# Patient Record
Sex: FEMALE | Race: WHITE | NOT HISPANIC OR LATINO | Employment: OTHER | URBAN - METROPOLITAN AREA
[De-identification: names, ages, dates, MRNs, and addresses within clinical notes are randomized per-mention and may not be internally consistent; named-entity substitution may affect disease eponyms.]

---

## 2017-11-15 ENCOUNTER — TELEPHONE (OUTPATIENT)
Dept: NEUROLOGY | Facility: CLINIC | Age: 47
End: 2017-11-15

## 2017-11-15 NOTE — TELEPHONE ENCOUNTER
Called and left message for Patient to call and make an appointment to be seen.Number provided.Attempted to call cell phone but number listed is not working.

## 2017-12-19 ENCOUNTER — OFFICE VISIT (OUTPATIENT)
Dept: NEUROLOGY | Facility: CLINIC | Age: 47
End: 2017-12-19
Payer: OTHER GOVERNMENT

## 2017-12-19 VITALS
BODY MASS INDEX: 28.99 KG/M2 | HEIGHT: 60 IN | WEIGHT: 147.69 LBS | HEART RATE: 67 BPM | SYSTOLIC BLOOD PRESSURE: 112 MMHG | DIASTOLIC BLOOD PRESSURE: 72 MMHG

## 2017-12-19 DIAGNOSIS — R51.9 CHRONIC DAILY HEADACHE: ICD-10-CM

## 2017-12-19 DIAGNOSIS — G43.709 CHRONIC MIGRAINE WITHOUT AURA WITHOUT STATUS MIGRAINOSUS, NOT INTRACTABLE: Primary | ICD-10-CM

## 2017-12-19 PROCEDURE — 64400 NJX AA&/STRD TRIGEMINAL NRV: CPT | Mod: S$PBB,51,RT, | Performed by: NURSE PRACTITIONER

## 2017-12-19 PROCEDURE — 99213 OFFICE O/P EST LOW 20 MIN: CPT | Mod: PBBFAC | Performed by: NURSE PRACTITIONER

## 2017-12-19 PROCEDURE — 64405 NJX AA&/STRD GR OCPL NRV: CPT | Mod: S$PBB,RT,, | Performed by: NURSE PRACTITIONER

## 2017-12-19 PROCEDURE — 99204 OFFICE O/P NEW MOD 45 MIN: CPT | Mod: 25,S$PBB,, | Performed by: NURSE PRACTITIONER

## 2017-12-19 PROCEDURE — 64400 NJX AA&/STRD TRIGEMINAL NRV: CPT | Mod: PBBFAC,RT | Performed by: NURSE PRACTITIONER

## 2017-12-19 PROCEDURE — 64405 NJX AA&/STRD GR OCPL NRV: CPT | Mod: PBBFAC,RT | Performed by: NURSE PRACTITIONER

## 2017-12-19 PROCEDURE — 99999 PR PBB SHADOW E&M-EST. PATIENT-LVL III: CPT | Mod: PBBFAC,,, | Performed by: NURSE PRACTITIONER

## 2017-12-19 RX ORDER — BUPROPION HYDROCHLORIDE 150 MG/1
150 TABLET ORAL DAILY
COMMUNITY
Start: 2017-12-08

## 2017-12-19 RX ORDER — ERGOCALCIFEROL 1.25 MG/1
50000 CAPSULE ORAL WEEKLY
COMMUNITY
Start: 2017-10-09 | End: 2018-10-19 | Stop reason: SDUPTHER

## 2017-12-19 RX ORDER — DIAZEPAM 5 MG/1
5 TABLET ORAL 2 TIMES DAILY PRN
COMMUNITY
Start: 2017-12-04

## 2017-12-19 RX ORDER — SUMATRIPTAN SUCCINATE 4 MG/.5ML
INJECTION, SOLUTION SUBCUTANEOUS
Qty: 12 EACH | Refills: 5 | Status: SHIPPED | OUTPATIENT
Start: 2017-12-19 | End: 2018-04-20 | Stop reason: SDUPTHER

## 2017-12-19 RX ORDER — TRAMADOL HYDROCHLORIDE 50 MG/1
50 TABLET ORAL 2 TIMES DAILY PRN
COMMUNITY
Start: 2017-12-04

## 2017-12-19 RX ORDER — ASPIRIN 325 MG
50000 TABLET, DELAYED RELEASE (ENTERIC COATED) ORAL
COMMUNITY
Start: 2016-08-30 | End: 2017-12-19 | Stop reason: SDUPTHER

## 2017-12-19 RX ORDER — DOXEPIN 6 MG/1
6 TABLET, FILM COATED ORAL NIGHTLY
COMMUNITY
End: 2019-02-22

## 2017-12-19 RX ORDER — DIVALPROEX SODIUM 500 MG/1
1000 TABLET, FILM COATED, EXTENDED RELEASE ORAL NIGHTLY
COMMUNITY
Start: 2017-08-23 | End: 2018-01-26 | Stop reason: SDUPTHER

## 2017-12-19 RX ORDER — PREGABALIN 150 MG/1
150 CAPSULE ORAL 2 TIMES DAILY
COMMUNITY
Start: 2016-08-29

## 2017-12-19 RX ORDER — ACETAMINOPHEN 500 MG
5000 TABLET ORAL DAILY
COMMUNITY
Start: 2016-08-30

## 2017-12-19 NOTE — PROGRESS NOTES
SUBJECTIVE:  Patient ID: Massiel Alatorre   MRN: [id number]  Referred By: Aaarefoj Self  Chief Complaint: Headache and Consult    History of Present Illness:   47 y.o. female with chronic migraines, anxiety, depression, vitamin D deficiency, and trouble sleeping, who presents to clinic with her significant other for evaluation of headaches.  Headaches previously managed by Neurologist in [place], but  forced her to establish care at alternative facility.    Headaches initially began in her early teenage years, states she has tried numerous medications none of which helped her.  Most recently she started Botox injections, states she had two rounds, after which she was only experiencing 7-8 headaches per month, last round done in August.  Since the middle of October, she has been experiencing a constant migraine all day, everyday.  Pain is described as a piercing or throbbing pain located on the right side of her head.  Onset of migraine very abrupt, over about 20 minutes.  Associated symptoms include nausea, photophobia, phonophobia.  She is currently taking Depakote 1000 mg nightly, Wellbutrin, Lyrica, and doxepin daily for prevention.  In the past she has used sumatriptan injectable for abortive, which she thinks worked, however caused facial flushing.  She would like to restart Botox injections for chronic migraine.      Treatments Tried and Response  Sumatriptan injectable - side effects, but it was effective    Imitrex - some relief   Maxalt - she did get some relief   Elavil - lost effectiveness   Doxepin -   Depakote   Cymbalta -   Topiramate - caused memory problems   Percocet -   Ultram -   Excedrin -   Lyrica -   Botox - helped   Wellbutrin - ?  Prednisone - helps, but causes her to break out in acne     Note from [facility]:  Number of headache days/month  Current: 25/30   Change from baseline: 30/30  Number of headache hours/headache day  Current: 12   Change from baseline: 24  Headache  intensity (1-10): 8   Rescue medication utilized: Excedrin migraine   Headache intensity after rescue medication (1-10): 6  Overall patient response to BOTOX® (onabotulinumtoxinA) treatment: Overall patient is Botox is helping with her migraines. Topamax was discontinued because it was causing memory loss. She continues depakote 250 mg twice daily. She does not like the unpleasant side effects of flushing feeling when using Imitrex injections. Headaches occur in right frontal region and radiate to occipital region. She reports auras of seeing purple and white spots. She reports photophobia. Weather changes trigger headaches.   Medications/therapies tried and failed in past: Norco, Ultram, Percocet, Excedrin Migraine, Lyrica, Topamax, Elavil, Cymbalta, Maxalt, and Imitrex    Current Medications:    ASPIRIN/ACETAMINOPHEN/CAFFEINE (EXCEDRIN MIGRAINE ORAL), Take by mouth 2 (two) times daily as needed., Disp: , Rfl:     cholecalciferol, vitamin D3, 5,000 unit Tab, Take 5,000 Units by mouth once daily., Disp: , Rfl:     divalproex ER (DEPAKOTE) 500 MG Tb24, Take 1,000 mg by mouth every evening., Disp: , Rfl:     doxepin (SILENOR) 6 mg Tab, Take 6 mg by mouth every evening., Disp: , Rfl:     pregabalin (LYRICA) 150 MG capsule, Take 150 mg by mouth 3 (three) times daily., Disp: , Rfl:     buPROPion (WELLBUTRIN XL) 150 MG TB24 tablet, Take 150 mg by mouth once daily., Disp: , Rfl:     diazePAM (VALIUM) 5 MG tablet, Take 5 mg by mouth 2 (two) times daily as needed., Disp: , Rfl:     ergocalciferol (ERGOCALCIFEROL) 50,000 unit Cap, Take 50,000 Units by mouth once a week., Disp: , Rfl:     SUMAtriptan succinate 4 mg/0.5 mL PnIj, Inject into skin at onset of migraine, may repeat dose in 1 hour if needed. No more than 12 mg/day. No more than 3 days per week., Disp: 12 each, Rfl: 5    traMADol (ULTRAM) 50 mg tablet, Take 50 mg by mouth 2 (two) times daily as needed., Disp: , Rfl:   Current Facility-Administered  Medications:     [START ON 1/19/2018] onabotulinumtoxina injection 200 Units, 200 Units, Intramuscular, Q90 Days, SHMUEL Irizarry    Review of Systems - as per HPI, otherwise a balanced 10 systems review is negative.    OBJECTIVE:  Vitals:  /72 (BP Location: Right arm, Patient Position: Sitting, BP Method: Large (Automatic))   Pulse 67   Ht 5' (1.524 m)   Wt 67 kg (147 lb 11.3 oz)   BMI 28.85 kg/m²     Physical Exam   Constitutional: She appears well-developed and well-nourished. She is well groomed. NAD  HENT:    Head: Normocephalic and atraumatic, oral and nasal mucosa intact.  Frontalis was NTTP, temporalis was NTTP   Eyes: Conjunctivae and EOM are normal. Pupils are equal, round, and reactive to light   Neck: Neck supple. .Occiput and trapezius NTTP bilaterally, DROM in neck in all 6 directions.   Musculoskeletal: Normal range of motion. No joint stiffness. No vertebral point tenderness.  Skin: Skin is warm and dry.  Psychiatric: Normal mood and affect.     Neuro exam:    Mental status:  The patient is awake, alert, and oriented to person, place and time.  Language is intact and fluent  Remote and recent memory are intact  Normal attention and concentration  Mood is stable    Cranial Nerves:  Fundoscopic examination does not reveal any occult papilledema.    Pupils are equal and reactive to light.    Extraocular movements are intact and without nystagmus.    Visual fields intact - subjective reporting of no peripheral vision in right eye   Facial movement is symmetric.  Facial sensation is intact.    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 Tongue in midline without fasciculation.   Shoulder shrug symmetrical.    Coordination:     Finger to nose - normal and symmetric bilaterally   Heel to shin test - normal and symmetric bilaterally     Motor:  Normal muscle bulk and symmetry. No fasciculations were noted.   Tremor not  apparent   Pronator drift not apparent.    strength was 5/5 on left and 4+/5 on right   Finger extension strength was strong and symmetric   RUE:appropriate against gravity and medium force as tested 5-/5  LUE: appropriate against gravity and medium force as tested 5/5  RLE:appropriate against gravity and medium force as tested 5-/5              LLE: appropriate against gravity and medium force as tested 5/5  Subjective complaint of right sided weakness, generalized weakness likely secondary to effort     Reflexes:  Right Brachioradialis 1+  Left Brachioradialis 1+  Right Biceps 1+  Left Biceps 1+  Right Patellar2+  Left Patellar 2+  Right Achilles 1+  Left Achilles 1+                          Plantar flexion bilat   Rodriguez was negative bilaterally      Sensory:  RUE  intact light touch  LUE intact light touch    RLE intact light touch  LLE intact light touch    Gait:   Romberg - negative  Gait intact    Review of Data:   Notes [facility]reyra at [facility]   Recent lab work from 12/2016 - unremarkable     ASSESSMENT:  1. Chronic migraine      Medical Decision Making:  BOTOX  The patient has chronic migraines (G43.719) and suffers from headaches more than 15 days a month lasting more than 4 hours a day with no relief of symptoms despite trying multiple medications including Elavil, Cymbalta, Wellbutrin, Topamax, Botox injections, Lyrica, Excedrin migraine, sumatriptan, maxalt, norco, ultram and percocet. Botox treatment was approved for chronic migraines in October 2010. The patient will be an ideal candidate for Botox. We are planning for 3 treatments 3 months apart and aiming for at least 50% improvement in the symptoms. If we see no improvement after 3 treatments, we will discontinue the injections.    PLAN:  - Right TNB and GONB performed in clinic today (see note below)  - Seek approval to restart Botox injections as soon as possible  - Offered to start prednisone taper to break up headache  cycle - she deferred as this causes her to break out in acne   - Continue current medications as currently directed   - For migraine abortive - recommended sumatriptan 4 mg injectable    Discussed decreasing her dose may cause less facial flushing   - No refills needed at this time   - Start tracking headaches via Migraine Deni jim on phone   - RTC in 1 month to initiate botox injections     Orders Placed This Encounter    SUMAtriptan succinate 4 mg/0.5 mL PnIj    lidocaine HCL 20 mg/ml (2%) injection 3 mL    methylPREDNISolone acetate injection 40 mg     I have discussed realistic goals of care with patient at length as well as medication options, and need for lifestyle adjustment. I have explained that treatment will take time. We have agreed that the goal will be to reduce frequency/intensity/quantity of HA, not to be completely HA free. I have explained my non narcotic policy regarding headache treatment.    Patient to track frequency of headaches.  Patient agreeable to work on lifestyle adjustments.    Procedure Note:   GONB (Greater Occipital Nerve Block): After informed consent was obtained (a copy was given to office staff to scan into the EMR), the patient was asked to remain in a sitting position her head resting prone on her chest. The area was prepped using alcohol swabs. 2% lidocaine (2 mL) and 40 mg depomedrol was drawn up utilizing a 21 gauge needle. The occipital trigger points were palpated utilizing latex gloves, a 27 gauge needle and aspiration occured to ensure no medication was introduced into the blood stream during the technique. The medication was delivered on the RIGHT in sites 1) midway between the inion and mastoid along the occipital ridge, 2) 2 finger breaths superior lateral to the first injection and 3) 2 finger breaths superior medial to the first injection. The patient tolerated the procedure well with no active bleeding, erythema, or discharge.      Procedure Note:   Nerve Block  (Trigeminal Nerve/Temporal Auricular Nerve CPT: 77388): After informed consent was obtained (asked office staff to scan into EMR), the patient was asked to remain in a sitting position.The area was prepped using alcohol swabs. 2% lidocaine (1.0 cc)  was drawn up utilizing a 22 gauge needle. A 30 gauge needle was used for the procedure. Three Temperoauricular locations were palpated on the RIGHT side of the head and 0.2 ccs injected into 3 separate sites (1 near the top of the ear and 2 along the parietal region of the head). 2 supraglabellar areas were palpated on the RIGHT, approx 5-6 mm above the orbital ridge and then 5-6 mm lateral along the orbital ridge. 0.2 cc per site for a total of 0.4 cc given. The patient tolerated the procedure well with no active bleeding, erythema, or discharge.  The patient was assessed and allowed to leave after ten minutes.  Findings from repeat exam:  Gen: NAD  Derm: no drainage  Neuro: AOx3, VFF, EOMI,  FROM of all extremities, gait WNL   Pre-Procedure Pain - 10 out of 10   Post-Procedure Pain- 2 out of 10     I spent 60 minutes face-to-face with the patient with >50% of the time spent with counseling and education regarding:  - results of data, diagnosis, and recommendations stated above  - risks, benefits, and potential side effects of botox injections and GONB/TNB discussed   - alternative treatment options including prednisone, physical therapy offered  - importance of healthy diet, regular exercise and sleep hygiene in the treatment of headaches      Questions and concerns were sought and answered to the patient's stated verbal satisfaction.  The patient verbalizes understanding and agreement with the above stated treatment plan.     CC: MD Erika Cohen, FNP-C  [facility]  [phone]    Dr. Guevara was available during today's encounter.

## 2017-12-26 PROCEDURE — 96372 THER/PROPH/DIAG INJ SC/IM: CPT | Mod: S$PBB,,, | Performed by: NURSE PRACTITIONER

## 2017-12-26 RX ORDER — METHYLPREDNISOLONE ACETATE 40 MG/ML
40 INJECTION, SUSPENSION INTRA-ARTICULAR; INTRALESIONAL; INTRAMUSCULAR; SOFT TISSUE
Status: COMPLETED | OUTPATIENT
Start: 2017-12-26 | End: 2017-12-26

## 2017-12-26 RX ORDER — LIDOCAINE HYDROCHLORIDE 20 MG/ML
3 INJECTION, SOLUTION INFILTRATION; PERINEURAL
Status: COMPLETED | OUTPATIENT
Start: 2017-12-26 | End: 2017-12-26

## 2017-12-26 RX ADMIN — METHYLPREDNISOLONE ACETATE 40 MG: 40 INJECTION, SUSPENSION INTRA-ARTICULAR; INTRALESIONAL; INTRAMUSCULAR; SOFT TISSUE at 03:12

## 2017-12-26 RX ADMIN — LIDOCAINE HYDROCHLORIDE 3 ML: 20 INJECTION, SOLUTION INFILTRATION; PERINEURAL at 03:12

## 2018-01-11 ENCOUNTER — TELEPHONE (OUTPATIENT)
Dept: NEUROLOGY | Facility: CLINIC | Age: 48
End: 2018-01-11

## 2018-01-11 NOTE — TELEPHONE ENCOUNTER
----- Message from Kimberly Monreal RN sent at 1/11/2018 12:31 PM CST -----  Contact: Self- [phone]  [place]-  Do you mind scheduling?  Thank you so very much!  Kimberly  ----- Message -----  From: Lisette Chaudhary  Sent: 1/11/2018  12:15 PM  To: Roland James Staff    Roland- pt called to reschedule her upcoming appt- originally for 1/19- pt is unable to make it due to a work conflict - would like to come the following week on a Mon or Fri if available- please call pt back at [phone]

## 2018-01-12 NOTE — TELEPHONE ENCOUNTER
----- Message from Anna Marie Boykin sent at 1/12/2018  2:54 PM CST -----  Contact: pt [phone]  Pt states she is returning a call from the nurse to reschedule her appointment on 01/19/18 to the following week.

## 2018-01-26 ENCOUNTER — PROCEDURE VISIT (OUTPATIENT)
Dept: NEUROLOGY | Facility: CLINIC | Age: 48
End: 2018-01-26
Payer: OTHER GOVERNMENT

## 2018-01-26 VITALS
SYSTOLIC BLOOD PRESSURE: 107 MMHG | HEIGHT: 60 IN | BODY MASS INDEX: 29.3 KG/M2 | DIASTOLIC BLOOD PRESSURE: 70 MMHG | WEIGHT: 149.25 LBS | HEART RATE: 68 BPM

## 2018-01-26 PROCEDURE — 64615 CHEMODENERV MUSC MIGRAINE: CPT | Mod: PBBFAC | Performed by: NURSE PRACTITIONER

## 2018-01-26 PROCEDURE — 64615 CHEMODENERV MUSC MIGRAINE: CPT | Mod: S$PBB,,, | Performed by: NURSE PRACTITIONER

## 2018-01-26 PROCEDURE — 99212 OFFICE O/P EST SF 10 MIN: CPT | Mod: 25,S$PBB,, | Performed by: NURSE PRACTITIONER

## 2018-01-26 RX ORDER — DIVALPROEX SODIUM 500 MG/1
1000 TABLET, FILM COATED, EXTENDED RELEASE ORAL NIGHTLY
Qty: 180 TABLET | Refills: 1 | Status: SHIPPED | OUTPATIENT
Start: 2018-01-26 | End: 2018-04-20 | Stop reason: SDUPTHER

## 2018-01-26 RX ORDER — SUMATRIPTAN SUCCINATE 100 MG/1
TABLET ORAL
Qty: 9 TABLET | Refills: 5 | Status: SHIPPED | OUTPATIENT
Start: 2018-01-26 | End: 2018-06-29 | Stop reason: SDUPTHER

## 2018-01-26 RX ADMIN — ONABOTULINUMTOXINA 200 UNITS: 100 INJECTION, POWDER, LYOPHILIZED, FOR SOLUTION INTRADERMAL; INTRAMUSCULAR at 10:01

## 2018-01-26 NOTE — PROCEDURES
SUBJECTIVE:  Patient ID: Massiel Alatorre  Chief Complaint: Headache; Botulinum Toxin Injection; and Follow-up    History of Present Illness:  47 y.o. female with chronic migraine, fibromyalgia, vitamin D deficiency, anxiety, and depression, who presents to clinic with her  for follow-up of headaches and to initiate Botox injections for chronic migraine.     Recommendations made at last Office Visit on 12/19/2017:  - Right TNB and GONB performed in clinic today (see note below)  - Seek approval to restart Botox injections as soon as possible  - Offered to start prednisone taper to break up headache cycle - she deferred as this causes her to break out in acne   - Continue current medications as currently directed   - For migraine abortive - recommended sumatriptan 4 mg injectable               Discussed decreasing her dose may cause less facial flushing   - No refills needed at this time   - Start tracking headaches via Migraine Deni jim on phone   - RTC in 1 month to initiate botox injections     Interval History:  In the interim, headaches she has not noticed any difference in her headaches.  Nerve blocks helped for 1-2 days, but then returned at previous intensity and frequency.  Sumatriptan injectable 4 mg did cause the flushing, however not as intense as 6 mg dose, and was effective with aborting her migraine, she is requesting to have sumatriptan pill available as well because she would prefer to not have to inject herself unless she really  Has to.  Previously had two rounds of Botox, last in August 2017, which were effective in preventing her migraines, she was noticing her headaches were easing off, she was not having as many aura events.  She denies any presence from Botox injections in the past and would like to restart the injections.      Initial ABREU HPI:  47 y.o. female with chronic migraines, anxiety, depression, vitamin D deficiency, and trouble sleeping, who presents to clinic with her significant  other for evaluation of headaches.  Headaches previously managed by Neurologist in [place], but  forced her to establish care at alternative facility.    Headaches initially began in her early teenage years, states she has tried numerous medications none of which helped her.  Most recently she started Botox injections, states she had two rounds, after which she was only experiencing 7-8 headaches per month, last round done in August.  Since the middle of October, she has been experiencing a constant migraine all day, everyday.  Pain is described as a piercing or throbbing pain located on the right side of her head.  Onset of migraine very abrupt, over about 20 minutes.  Associated symptoms include nausea, photophobia, phonophobia.  She is currently taking Depakote 1000 mg nightly, Wellbutrin, Lyrica, and doxepin daily for prevention.  In the past she has used sumatriptan injectable for abortive, which she thinks worked, however caused facial flushing.  She would like to restart Botox injections for chronic migraine.       Treatments Tried and Response  Sumatriptan injectable - side effects, but it was effective    Imitrex - some relief   Maxalt - she did get some relief   Elavil - lost effectiveness   Doxepin -   Depakote   Cymbalta -   Topiramate - caused memory problems   Percocet -   Ultram -   Excedrin -   Lyrica -   Botox - helped   Wellbutrin - ?  Prednisone - helps, but causes her to break out in acne     Current Medications:    ASPIRIN/ACETAMINOPHEN/CAFFEINE (EXCEDRIN MIGRAINE ORAL), Take by mouth 2 (two) times daily as needed., Disp: , Rfl:     buPROPion (WELLBUTRIN XL) 150 MG TB24 tablet, Take 150 mg by mouth once daily., Disp: , Rfl:     cholecalciferol, vitamin D3, 5,000 unit Tab, Take 5,000 Units by mouth once daily., Disp: , Rfl:     diazePAM (VALIUM) 5 MG tablet, Take 5 mg by mouth 2 (two) times daily as needed., Disp: , Rfl:     divalproex ER (DEPAKOTE) 500 MG Tb24, Take 2 tablets  (1,000 mg total) by mouth every evening., Disp: 180 tablet, Rfl: 1    doxepin (SILENOR) 6 mg Tab, Take 6 mg by mouth every evening., Disp: , Rfl:     ergocalciferol (ERGOCALCIFEROL) 50,000 unit Cap, Take 50,000 Units by mouth once a week., Disp: , Rfl:     pregabalin (LYRICA) 150 MG capsule, Take 150 mg by mouth 3 (three) times daily., Disp: , Rfl:     SUMAtriptan succinate 4 mg/0.5 mL PnIj, Inject into skin at onset of migraine, may repeat dose in 1 hour if needed. No more than 12 mg/day. No more than 3 days per week., Disp: 12 each, Rfl: 5    traMADol (ULTRAM) 50 mg tablet, Take 50 mg by mouth 2 (two) times daily as needed., Disp: , Rfl:     sumatriptan (IMITREX) 100 MG tablet, 1 tab at onset of migraine, may repeat in 2 hrs if needed. No more than 2 tabs/day or 3 days/week., Disp: 9 tablet, Rfl: 5    Current Facility-Administered Medications:     onabotulinumtoxina injection 200 Units, 200 Units, Intramuscular, Q90 Days, SHMUEL Irizarry    Review of Systems - as per HPI, otherwise a balanced 10 systems review is negative.    OBJECTIVE:  Vitals:  /70 (BP Location: Left arm, Patient Position: Sitting, BP Method: Large (Automatic))   Pulse 68   Ht 5' (1.524 m)   Wt 67.7 kg (149 lb 4 oz)   BMI 29.15 kg/m²     Physical Exam:  Constitutional: she appears well-developed and well-nourished. she is well groomed. NAD   HENT:    Head: Normocephalic and atraumatic  Eyes: Conjunctivae and EOM are normal  Musculoskeletal: Normal range of motion. No joint stiffness.   Skin: Skin is warm and dry.  Psychiatric: Mood and affect are normal    Neuro: Patient is awake, alert, and oriented to person, place, and time. Language is intact and fluent.  Recent and remote memory are intact.  Normal attention and concentration.  Facial movement is symmetric.  Gait is normal.     ASSESSMENT:  1. Chronic migraine      PLAN:  - Botox administered in clinic for Chronic Migraine (see below)   - Continue Depakote 1000 mg  nightly, refills provided  - For migraine abortive - given sumatriptan 100 mg tabs, also has sumatriptan SQ available  - Discussed at length appropriate use of sumatriptan PO and SQ  - RTC in 3 months for repeat Botox injections or sooner if needed     Orders Placed This Encounter    divalproex ER (DEPAKOTE) 500 MG Tb24    sumatriptan (IMITREX) 100 MG tablet     All questions and concerns addressed.  Patient verbalizes understanding and is agreeable with the above stated treatment plan.      PROCEDURE NOTE:  BOTOX was performed as an indicated therapy for intractable chronic migraine headaches given that the patient failed more than 2 headache medications    Two patient identifiers were confirmed with the patient prior to performing this procedure. A time out to determine correct patient and and agreement on procedure performed was conducted prior to the procedure.      Botulinum Toxin Injection Procedure   Procedure: Chemical neurolysis   After risks and benefits were explained including bleeding, infection, worsening of pain, damage to the areas being injected, weakness of muscles, loss of muscle control, dysphagia if injecting the head or neck, facial droop if injecting the facial area, painful injection, allergic or other reaction to the medications being injected, and the failure of the procedure to help the problem, a signed consent was obtained.   The patient was placed in a comfortable area and the sites to be treated were identified.The area to be treated was prepped three times with alcohol and the alcohol allowed to dry. Next, a 30 gauge needle was used to inject the medication in the area to be treated.      Total Botox used: 155 Units   Botox wastage: 45 Units     Injection sites:    muscle bilaterally ( a total of 10 units divided into 2 sites)   Procerus muscle (5 units)   Frontalis muscle bilaterally (a total of 20 units divided into 4 sites)   Temporalis muscle bilaterally (a total of 40  units divided into 8 sites)   Occipitalis muscle bilaterally (a total of 30 units divided into 6 sites)   Cervical paraspinal muscles (a total of 20 units divided into 4 sites)   Trapezius muscle bilaterally (a total of 30 units divided into 6 sites)   Complications: none   RTC for the next Botox injection: 3 months     CC: MD Erika Cohen, SHMUEL-C  [facility]   [phone]

## 2018-04-13 DIAGNOSIS — G43.709 CHRONIC MIGRAINE WITHOUT AURA WITHOUT STATUS MIGRAINOSUS, NOT INTRACTABLE: Primary | ICD-10-CM

## 2018-04-20 ENCOUNTER — PROCEDURE VISIT (OUTPATIENT)
Dept: NEUROLOGY | Facility: CLINIC | Age: 48
End: 2018-04-20
Payer: OTHER GOVERNMENT

## 2018-04-20 VITALS
BODY MASS INDEX: 28.07 KG/M2 | SYSTOLIC BLOOD PRESSURE: 106 MMHG | WEIGHT: 143 LBS | HEART RATE: 92 BPM | HEIGHT: 60 IN | DIASTOLIC BLOOD PRESSURE: 72 MMHG

## 2018-04-20 DIAGNOSIS — M54.2 NECK PAIN: ICD-10-CM

## 2018-04-20 PROCEDURE — 64615 CHEMODENERV MUSC MIGRAINE: CPT | Mod: S$PBB,,, | Performed by: NURSE PRACTITIONER

## 2018-04-20 PROCEDURE — 64615 CHEMODENERV MUSC MIGRAINE: CPT | Mod: PBBFAC | Performed by: NURSE PRACTITIONER

## 2018-04-20 RX ORDER — DIVALPROEX SODIUM 500 MG/1
1000 TABLET, FILM COATED, EXTENDED RELEASE ORAL NIGHTLY
Qty: 180 TABLET | Refills: 1 | Status: SHIPPED | OUTPATIENT
Start: 2018-04-20 | End: 2018-06-29 | Stop reason: SDUPTHER

## 2018-04-20 RX ORDER — SUMATRIPTAN SUCCINATE 4 MG/.5ML
INJECTION, SOLUTION SUBCUTANEOUS
Qty: 12 EACH | Refills: 5 | Status: SHIPPED | OUTPATIENT
Start: 2018-04-20 | End: 2018-10-19 | Stop reason: SDUPTHER

## 2018-04-20 RX ORDER — HYDROXYZINE PAMOATE 50 MG/1
CAPSULE ORAL
Qty: 2 CAPSULE | Refills: 0 | Status: SHIPPED | OUTPATIENT
Start: 2018-04-20 | End: 2019-08-09 | Stop reason: ALTCHOICE

## 2018-04-20 RX ADMIN — ONABOTULINUMTOXINA 200 UNITS: 100 INJECTION, POWDER, LYOPHILIZED, FOR SOLUTION INTRADERMAL; INTRAMUSCULAR at 10:04

## 2018-04-20 NOTE — PROCEDURES
SUBJECTIVE:  Patient ID: Massiel Alatorre  Chief Complaint: Headache; Botulinum Toxin Injection; and Follow-up    History of Present Illness:  Massiel Alatorre is a 47 y.o. female who presents to clinic alone for follow-up of headaches and Botox injections.     04/20/2018- Interval History:  Headaches continue to occur nearly everyday, states for the first 6-8 weeks after Botox injections she does notice a decrease in the intensity of her headaches.  She is very happy to report she actually had two headache free days in a row since her last Botox injections.  For the first 4-6 weeks after last Botox injections she was actually having one headache free day per week, which she is happy about.  She can definitely tell when she is due for her next Botox injections, the intensity, frequency and duration of her headaches are significantly worse.  She continues to feel a portion of her headaches and migraines are coming from her neck and she requests to have imaging done of her neck. She does wish to continue with Botox injections for chronic migraine.      Otherwise, information below is still accurate and current.     Interval History:  In the interim, headaches she has not noticed any difference in her headaches.  Nerve blocks helped for 1-2 days, but then returned at previous intensity and frequency.  Sumatriptan injectable 4 mg did cause the flushing, however not as intense as 6 mg dose, and was effective with aborting her migraine, she is requesting to have sumatriptan pill available as well because she would prefer to not have to inject herself unless she really  Has to.  Previously had two rounds of Botox, last in August 2017, which were effective in preventing her migraines, she was noticing her headaches were easing off, she was not having as many aura events.  She denies any presence from Botox injections in the past and would like to restart the injections.       Initial ABREU HPI:  47 y.o. female with chronic migraines,  anxiety, depression, vitamin D deficiency, and trouble sleeping, who presents to clinic with her significant other for evaluation of headaches.  Headaches previously managed by Neurologist in [place], but  forced her to establish care at alternative facility.    Headaches initially began in her early teenage years, states she has tried numerous medications none of which helped her.  Most recently she started Botox injections, states she had two rounds, after which she was only experiencing 7-8 headaches per month, last round done in August.  Since the middle of October, she has been experiencing a constant migraine all day, everyday.  Pain is described as a piercing or throbbing pain located on the right side of her head.  Onset of migraine very abrupt, over about 20 minutes.  Associated symptoms include nausea, photophobia, phonophobia.  She is currently taking Depakote 1000 mg nightly, Wellbutrin, Lyrica, and doxepin daily for prevention.  In the past she has used sumatriptan injectable for abortive, which she thinks worked, however caused facial flushing.  She would like to restart Botox injections for chronic migraine.       Treatments Tried and Response  Sumatriptan injectable - side effects, but it was effective    Imitrex - some relief   Maxalt - she did get some relief   Elavil - lost effectiveness   Doxepin -   Depakote   Cymbalta -   Topiramate - caused memory problems   Percocet -   Ultram -   Excedrin -   Lyrica -   Botox - helped   Wellbutrin - ?  Prednisone - helps, but causes her to break out in acne    Current Medications:    ASPIRIN/ACETAMINOPHEN/CAFFEINE (EXCEDRIN MIGRAINE ORAL), Take by mouth 2 (two) times daily as needed., Disp: , Rfl:     buPROPion (WELLBUTRIN XL) 150 MG TB24 tablet, Take 150 mg by mouth once daily., Disp: , Rfl:     cholecalciferol, vitamin D3, 5,000 unit Tab, Take 5,000 Units by mouth once daily., Disp: , Rfl:     diazePAM (VALIUM) 5 MG tablet, Take 5 mg by  mouth 2 (two) times daily as needed., Disp: , Rfl:     divalproex ER (DEPAKOTE) 500 MG Tb24, Take 2 tablets (1,000 mg total) by mouth every evening., Disp: 180 tablet, Rfl: 1    doxepin (SILENOR) 6 mg Tab, Take 6 mg by mouth every evening., Disp: , Rfl:     ergocalciferol (ERGOCALCIFEROL) 50,000 unit Cap, Take 50,000 Units by mouth once a week., Disp: , Rfl:     pregabalin (LYRICA) 150 MG capsule, Take 150 mg by mouth 3 (three) times daily., Disp: , Rfl:     sumatriptan (IMITREX) 100 MG tablet, 1 tab at onset of migraine, may repeat in 2 hrs if needed. No more than 2 tabs/day or 3 days/week., Disp: 9 tablet, Rfl: 5    SUMAtriptan succinate 4 mg/0.5 mL PnIj, Inject into skin at onset of migraine, may repeat dose in 1 hour if needed. No more than 12 mg/day. No more than 3 days per week., Disp: 12 each, Rfl: 5    traMADol (ULTRAM) 50 mg tablet, Take 50 mg by mouth 2 (two) times daily as needed., Disp: , Rfl:     hydrOXYzine pamoate (VISTARIL) 50 MG Cap, Take 1 capsule 60 minutes prior to imaging, may repeat dose in 1 hour if needed.  Must secure ride to and from MRI., Disp: 2 capsule, Rfl: 0    Current Facility-Administered Medications:     onabotulinumtoxina injection 200 Units, 200 Units, Intramuscular, Q90 Days, SHMUEL Irizarry, 200 Units at 04/20/18 1015    Review of Systems - as per HPI, otherwise a balanced 10 systems review is negative.    OBJECTIVE:  Vitals:  /72   Pulse 92   Ht 5' (1.524 m)   Wt 64.9 kg (143 lb)   BMI 27.93 kg/m²     Physical Exam:  Constitutional: she appears well-developed and well-nourished. she is well groomed. NAD   HENT:    Head: Normocephalic and atraumatic  Eyes: Conjunctivae and EOM are normal  Musculoskeletal: Normal range of motion. No joint stiffness.   Skin: Skin is warm and dry.  Psychiatric: Mood and affect are normal    Neuro: Patient is awake, alert and oriented to person, place and time.  Moves all 4 extremities against gravity. Gait and station  normal.  Cranial Nerves II through XII without focal deficit.     ASSESSMENT:  1. Chronic migraine    2. Neck pain      PLAN:  - Botox administered in clinic for Chronic Migraine (see below)   - Continue Lyrica, Depakote, and wellbutrin for migraine prevention   - Will start trial of GammaCore device   - For migraine abortive - refilled Sumatriptan 4 mg injectable   - For rescue - refilled vistaril   - MRI c-Spine ordered   - RTC in 11 weeks for repeat Botox injections or sooner if needed     Orders Placed This Encounter    MRI Cervical Spine Without Contrast    hydrOXYzine pamoate (VISTARIL) 50 MG Cap    divalproex ER (DEPAKOTE) 500 MG Tb24    SUMAtriptan succinate 4 mg/0.5 mL PnIj       All questions and concerns addressed.  Patient verbalizes understanding and is agreeable with the above stated treatment plan.      PROCEDURE NOTE:  BOTOX was performed as an indicated therapy for intractable chronic migraine headaches given that the patient failed more than 2 headache medications    Two patient identifiers were confirmed with the patient prior to performing this procedure. A time out to determine correct patient and and agreement on procedure performed was conducted prior to the procedure.      Botulinum Toxin Injection Procedure   Procedure: Chemical neurolysis   After risks and benefits were explained including bleeding, infection, worsening of pain, damage to the areas being injected, weakness of muscles, loss of muscle control, dysphagia if injecting the head or neck, facial droop if injecting the facial area, painful injection, allergic or other reaction to the medications being injected, and the failure of the procedure to help the problem, a signed consent was obtained.   The patient was placed in a comfortable area and the sites to be treated were identified.The area to be treated was prepped three times with alcohol and the alcohol allowed to dry. Next, a 30 gauge needle was used to inject the  medication in the area to be treated.      Total Botox used: 155 Units   Botox wastage: 45 Units     Injection sites:    muscle bilaterally ( a total of 10 units divided into 2 sites)   Procerus muscle (5 units)   Frontalis muscle bilaterally (a total of 20 units divided into 4 sites)   Temporalis muscle bilaterally (a total of 40 units divided into 8 sites)   Occipitalis muscle bilaterally (a total of 30 units divided into 6 sites)   Cervical paraspinal muscles (a total of 20 units divided into 4 sites)   Trapezius muscle bilaterally (a total of 30 units divided into 6 sites)   Complications: none   RTC for the next Botox injection: 11 weeks     CC: Melanie K Hall, MD Elizabeth C Vulevich, FNP-C [facility]   [phone]

## 2018-06-29 ENCOUNTER — PROCEDURE VISIT (OUTPATIENT)
Dept: NEUROLOGY | Facility: CLINIC | Age: 48
End: 2018-06-29
Payer: OTHER GOVERNMENT

## 2018-06-29 VITALS
WEIGHT: 145.06 LBS | HEART RATE: 70 BPM | BODY MASS INDEX: 28.48 KG/M2 | SYSTOLIC BLOOD PRESSURE: 95 MMHG | DIASTOLIC BLOOD PRESSURE: 56 MMHG | HEIGHT: 60 IN

## 2018-06-29 DIAGNOSIS — M79.7 FIBROMYALGIA: ICD-10-CM

## 2018-06-29 PROCEDURE — 99213 OFFICE O/P EST LOW 20 MIN: CPT | Mod: 25,S$PBB,, | Performed by: NURSE PRACTITIONER

## 2018-06-29 PROCEDURE — 64615 CHEMODENERV MUSC MIGRAINE: CPT | Mod: S$PBB,,, | Performed by: NURSE PRACTITIONER

## 2018-06-29 PROCEDURE — 64615 CHEMODENERV MUSC MIGRAINE: CPT | Mod: PBBFAC | Performed by: NURSE PRACTITIONER

## 2018-06-29 RX ORDER — SUMATRIPTAN SUCCINATE 100 MG/1
TABLET ORAL
Qty: 9 TABLET | Refills: 11 | Status: SHIPPED | OUTPATIENT
Start: 2018-06-29 | End: 2018-10-19 | Stop reason: SDUPTHER

## 2018-06-29 RX ORDER — DIVALPROEX SODIUM 500 MG/1
1000 TABLET, FILM COATED, EXTENDED RELEASE ORAL NIGHTLY
Qty: 180 TABLET | Refills: 3 | Status: SHIPPED | OUTPATIENT
Start: 2018-06-29 | End: 2018-10-19 | Stop reason: SDUPTHER

## 2018-06-29 RX ADMIN — ONABOTULINUMTOXINA 200 UNITS: 100 INJECTION, POWDER, LYOPHILIZED, FOR SOLUTION INTRADERMAL; INTRAMUSCULAR at 10:06

## 2018-06-29 NOTE — PROCEDURES
SUBJECTIVE:  Patient ID: Massiel Alatorre  Chief Complaint: Botulinum Toxin Injection and Follow-up    History of Present Illness:  Massiel Alatorre is a 48 y.o. female who presents to clinic with her  for follow-up of headaches and Botox injections.     Recommendations made at last Office Visit on 4/20/2018:  - Botox administered in clinic for Chronic Migraine (see below)   - Continue Lyrica, Depakote, and wellbutrin for migraine prevention   - Will start trial of GammaCore device   - For migraine abortive - refilled Sumatriptan 4 mg injectable   - For rescue - refilled vistaril   - MRI c-Spine ordered   - RTC in 11 weeks for repeat Botox injections or sooner if needed     06/29/2018- Interval History:  She continues to experience 5 days where she is headache free, prior to Botox, she was not having any headache free days.  Currently complaining of a headache rated 20 out of 10, though she is conversant, pleasant and laughing during clinic visit, well.  She has received GammaCore device, which she does feel has decreased the intensity of her migraines, though it does not fully abort her migraines.  She has continued all medications as directed.  Has complaints of short term memory disturbances, which is very concerning to her, is considering filing for disability for memory disturbance.  Also complains of chronic pain and fibromyalgia for which she is under the care of Dr. Gomez and Dr. Urbano at [facility] in [place].  MRI C-Spine never done, will have imaging scheduled today. She does not wish to make adjustments to her treatment plan at this time, would like to do one more round of Botox to see if it gives her greater response, if not, will be willing to make adjustments at that time.  Headaches continue to feel the same as they always have, she denies any change in the quality or nature of her headaches.     04/20/2018- Interval History:  Headaches continue to occur nearly everyday, states for the  first 6-8 weeks after Botox injections she does notice a decrease in the intensity of her headaches.  She is very happy to report she actually had two headache free days in a row since her last Botox injections.  For the first 4-6 weeks after last Botox injections she was actually having one headache free day per week, which she is happy about.  She can definitely tell when she is due for her next Botox injections, the intensity, frequency and duration of her headaches are significantly worse.  She continues to feel a portion of her headaches and migraines are coming from her neck and she requests to have imaging done of her neck. She does wish to continue with Botox injections for chronic migraine.       Interval History:  In the interim, headaches she has not noticed any difference in her headaches.  Nerve blocks helped for 1-2 days, but then returned at previous intensity and frequency.  Sumatriptan injectable 4 mg did cause the flushing, however not as intense as 6 mg dose, and was effective with aborting her migraine, she is requesting to have sumatriptan pill available as well because she would prefer to not have to inject herself unless she really  Has to.  Previously had two rounds of Botox, last in August 2017, which were effective in preventing her migraines, she was noticing her headaches were easing off, she was not having as many aura events.  She denies any presence from Botox injections in the past and would like to restart the injections.       Initial ABREU HPI:  47 y.o. female with chronic migraines, anxiety, depression, vitamin D deficiency, and trouble sleeping, who presents to clinic with her significant other for evaluation of headaches.  Headaches previously managed by Neurologist in [place], but  forced her to establish care at alternative facility.    Headaches initially began in her early teenage years, states she has tried numerous medications none of which helped her.  Most  recently she started Botox injections, states she had two rounds, after which she was only experiencing 7-8 headaches per month, last round done in August.  Since the middle of October, she has been experiencing a constant migraine all day, everyday.  Pain is described as a piercing or throbbing pain located on the right side of her head.  Onset of migraine very abrupt, over about 20 minutes.  Associated symptoms include nausea, photophobia, phonophobia.  She is currently taking Depakote 1000 mg nightly, Wellbutrin, Lyrica, and doxepin daily for prevention.  In the past she has used sumatriptan injectable for abortive, which she thinks worked, however caused facial flushing.  She would like to restart Botox injections for chronic migraine.       Treatments Tried and Response  Sumatriptan injectable - side effects, but it was effective    Imitrex - some relief   Maxalt - she did get some relief   Elavil - lost effectiveness   Doxepin -   Depakote   Cymbalta -   Topiramate - caused memory problems   Percocet -   Ultram -   Excedrin -   Lyrica -   Botox - helped   Wellbutrin - ?  Prednisone - helps, but causes her to break out in acne    Current Medications:    ASPIRIN/ACETAMINOPHEN/CAFFEINE (EXCEDRIN MIGRAINE ORAL), Take by mouth 2 (two) times daily as needed., Disp: , Rfl:     buPROPion (WELLBUTRIN XL) 150 MG TB24 tablet, Take 150 mg by mouth once daily., Disp: , Rfl:     cholecalciferol, vitamin D3, 5,000 unit Tab, Take 5,000 Units by mouth once daily., Disp: , Rfl:     diazePAM (VALIUM) 5 MG tablet, Take 5 mg by mouth 2 (two) times daily as needed., Disp: , Rfl:     divalproex ER (DEPAKOTE) 500 MG Tb24, Take 2 tablets (1,000 mg total) by mouth every evening., Disp: 180 tablet, Rfl: 3    doxepin (SILENOR) 6 mg Tab, Take 6 mg by mouth every evening., Disp: , Rfl:     ergocalciferol (ERGOCALCIFEROL) 50,000 unit Cap, Take 50,000 Units by mouth once a week., Disp: , Rfl:     hydrOXYzine pamoate (VISTARIL) 50 MG  Cap, Take 1 capsule 60 minutes prior to imaging, may repeat dose in 1 hour if needed.  Must secure ride to and from MRI., Disp: 2 capsule, Rfl: 0    pregabalin (LYRICA) 150 MG capsule, Take 150 mg by mouth 2 (two) times daily. , Disp: , Rfl:     sumatriptan (IMITREX) 100 MG tablet, 1 tab at onset of migraine, may repeat in 2 hrs if needed. No more than 2 tabs/day or 3 days/week., Disp: 9 tablet, Rfl: 11    SUMAtriptan succinate 4 mg/0.5 mL PnIj, Inject into skin at onset of migraine, may repeat dose in 1 hour if needed. No more than 12 mg/day. No more than 3 days per week., Disp: 12 each, Rfl: 5    traMADol (ULTRAM) 50 mg tablet, Take 50 mg by mouth 2 (two) times daily as needed., Disp: , Rfl:     Current Facility-Administered Medications:     onabotulinumtoxina injection 200 Units, 200 Units, Intramuscular, Q90 Days, SHMUEL Irizarry, 200 Units at 06/29/18 1047    onabotulinumtoxina injection 200 Units, 200 Units, Intramuscular, Q90 Days, SHMUEL Irizarry    Review of Systems - as per HPI, otherwise a balanced 10 systems review is negative.    OBJECTIVE:  Vitals:  BP (!) 95/56 (BP Location: Left arm, Patient Position: Sitting, BP Method: Large (Automatic))   Pulse 70   Ht 5' (1.524 m)   Wt 65.8 kg (145 lb 1 oz)   BMI 28.33 kg/m²     Physical Exam:  Constitutional: she appears well-developed and well-nourished. she is well groomed. NAD     ASSESSMENT:  1. Chronic migraine    2. Fibromyalgia      PLAN:  - Botox administered in clinic for Chronic Migraine (see below)   - Continue all medications as directed, does not wish to change preventive meds at this time   - For migraine abortive - has Sumatriptan SQ and PO   - For rescue - has Tramadol available   - Encouraged her to continue regular f/up with pain management as Fibromyalgia pain can and most likely is contributing to her migraines   - Would consider Aimovig in the future if needed   - Refills provided   - Will have MA schedule MRI  C-Spine prior to her departure from clinic   - RTC in 11 weeks for repeat Botox injections or sooner if needed     Orders Placed This Encounter    divalproex ER (DEPAKOTE) 500 MG Tb24    sumatriptan (IMITREX) 100 MG tablet     All questions and concerns addressed.  Patient verbalizes understanding and is agreeable with the above stated treatment plan.      PROCEDURE NOTE:  BOTOX was performed as an indicated therapy for intractable chronic migraine headaches given that the patient failed more than 2 headache medications    Two patient identifiers were confirmed with the patient prior to performing this procedure. A time out to determine correct patient and and agreement on procedure performed was conducted prior to the procedure.      Botulinum Toxin Injection Procedure   Procedure: Chemical neurolysis   After risks and benefits were explained including bleeding, infection, worsening of pain, damage to the areas being injected, weakness of muscles, loss of muscle control, dysphagia if injecting the head or neck, facial droop if injecting the facial area, painful injection, allergic or other reaction to the medications being injected, and the failure of the procedure to help the problem, a signed consent was obtained.   The patient was placed in a comfortable area and the sites to be treated were identified.The area to be treated was prepped three times with alcohol and the alcohol allowed to dry. Next, a 30 gauge needle was used to inject the medication in the area to be treated.      Total Botox used: 155 Units   Botox wastage: 45 Units     Injection sites:    muscle bilaterally ( a total of 10 units divided into 2 sites)   Procerus muscle (5 units)   Frontalis muscle bilaterally (a total of 20 units divided into 4 sites)   Temporalis muscle bilaterally (a total of 40 units divided into 8 sites)   Occipitalis muscle bilaterally (a total of 30 units divided into 6 sites)   Cervical paraspinal muscles (a  total of 20 units divided into 4 sites)   Trapezius muscle bilaterally (a total of 30 units divided into 6 sites)   Complications: none   RTC for the next Botox injection: 11 weeks     CC: Melanie K Hall, MD Elizabeth C Vulevich, FNP-C [facility]   [phone]

## 2018-07-09 ENCOUNTER — HOSPITAL ENCOUNTER (OUTPATIENT)
Dept: RADIOLOGY | Facility: HOSPITAL | Age: 48
Discharge: HOME | End: 2018-07-09
Attending: NURSE PRACTITIONER
Payer: OTHER GOVERNMENT

## 2018-07-09 DIAGNOSIS — M54.2 NECK PAIN: ICD-10-CM

## 2018-07-09 PROCEDURE — 72141 MRI NECK SPINE W/O DYE: CPT | Mod: TC

## 2018-07-09 PROCEDURE — 72141 MRI NECK SPINE W/O DYE: CPT | Mod: 26,,, | Performed by: RADIOLOGY

## 2018-09-07 ENCOUNTER — PROCEDURE VISIT (OUTPATIENT)
Dept: NEUROLOGY | Facility: CLINIC | Age: 48
End: 2018-09-07
Payer: OTHER GOVERNMENT

## 2018-09-07 VITALS
HEIGHT: 60 IN | HEART RATE: 70 BPM | BODY MASS INDEX: 28.57 KG/M2 | WEIGHT: 145.5 LBS | SYSTOLIC BLOOD PRESSURE: 106 MMHG | DIASTOLIC BLOOD PRESSURE: 60 MMHG

## 2018-09-07 DIAGNOSIS — M48.02 CERVICAL STENOSIS OF SPINE: ICD-10-CM

## 2018-09-07 DIAGNOSIS — M54.2 NECK PAIN: ICD-10-CM

## 2018-09-07 DIAGNOSIS — M79.18 MYOFASCIAL PAIN: ICD-10-CM

## 2018-09-07 PROCEDURE — 64615 CHEMODENERV MUSC MIGRAINE: CPT | Mod: PBBFAC | Performed by: NURSE PRACTITIONER

## 2018-09-07 PROCEDURE — 64615 CHEMODENERV MUSC MIGRAINE: CPT | Mod: S$PBB,,, | Performed by: NURSE PRACTITIONER

## 2018-09-07 RX ADMIN — ONABOTULINUMTOXINA 200 UNITS: 100 INJECTION, POWDER, LYOPHILIZED, FOR SOLUTION INTRADERMAL; INTRAMUSCULAR at 11:09

## 2018-09-07 NOTE — PROCEDURES
SUBJECTIVE:  Patient ID: Massiel Alatorre  Chief Complaint: Botulinum Toxin Injection    History of Present Illness:  Massiel Alatorre is a 48 y.o. female who presents to clinic with her daughter for follow-up of headaches and Botox injections.  Patient arrives to clinic 30 minutes late for appointment.      Recommendations made at last Office Visit on 6/29/2018:  - Botox administered in clinic for Chronic Migraine (see below)   - Continue all medications as directed, does not wish to change preventive meds at this time   - For migraine abortive - has Sumatriptan SQ and PO   - For rescue - has Tramadol available   - Encouraged her to continue regular f/up with pain management as Fibromyalgia pain can and most likely is contributing to her migraines   - Would consider Aimovig in the future if needed   - Refills provided   - Will have MA schedule MRI C-Spine prior to her departure from clinic   - RTC in 11 weeks for repeat Botox injections or sooner if needed     09/07/2018- Interval History:  Patient 30 minutes late for her appointment, discussed would administered Botox today as she drove from [place], however will need to see her back in 6 weeks for follow-up, which she is agreeable to.      Current Medications:    ASPIRIN/ACETAMINOPHEN/CAFFEINE (EXCEDRIN MIGRAINE ORAL), Take by mouth 2 (two) times daily as needed., Disp: , Rfl:     buPROPion (WELLBUTRIN XL) 150 MG TB24 tablet, Take 150 mg by mouth once daily., Disp: , Rfl:     cholecalciferol, vitamin D3, 5,000 unit Tab, Take 5,000 Units by mouth once daily., Disp: , Rfl:     diazePAM (VALIUM) 5 MG tablet, Take 5 mg by mouth 2 (two) times daily as needed., Disp: , Rfl:     divalproex ER (DEPAKOTE) 500 MG Tb24, Take 2 tablets (1,000 mg total) by mouth every evening., Disp: 180 tablet, Rfl: 3    doxepin (SILENOR) 6 mg Tab, Take 6 mg by mouth every evening., Disp: , Rfl:     ergocalciferol (ERGOCALCIFEROL) 50,000 unit Cap, Take 50,000 Units by mouth once a week.,  Disp: , Rfl:     hydrOXYzine pamoate (VISTARIL) 50 MG Cap, Take 1 capsule 60 minutes prior to imaging, may repeat dose in 1 hour if needed.  Must secure ride to and from MRI., Disp: 2 capsule, Rfl: 0    pregabalin (LYRICA) 150 MG capsule, Take 150 mg by mouth 2 (two) times daily. , Disp: , Rfl:     sumatriptan (IMITREX) 100 MG tablet, 1 tab at onset of migraine, may repeat in 2 hrs if needed. No more than 2 tabs/day or 3 days/week., Disp: 9 tablet, Rfl: 11    SUMAtriptan succinate 4 mg/0.5 mL PnIj, Inject into skin at onset of migraine, may repeat dose in 1 hour if needed. No more than 12 mg/day. No more than 3 days per week., Disp: 12 each, Rfl: 5    traMADol (ULTRAM) 50 mg tablet, Take 50 mg by mouth 2 (two) times daily as needed., Disp: , Rfl:     Current Facility-Administered Medications:     onabotulinumtoxina injection 200 Units, 200 Units, Intramuscular, Q90 Days, Erika Vulevich, FNP, 200 Units at 09/07/18 1152    onabotulinumtoxina injection 200 Units, 200 Units, Intramuscular, Q90 Days, Erika Vulevich, FNP    onabotulinumtoxina injection 200 Units, 200 Units, Intramuscular, Q90 Days, Erika Vulevich, FNP    Review of Systems - as per HPI, otherwise a balanced 10 systems review is negative.    OBJECTIVE:  Vitals:  /60 (BP Location: Left arm, Patient Position: Sitting, BP Method: Large (Automatic))   Pulse 70   Ht 5' (1.524 m)   Wt 66 kg (145 lb 8.1 oz)   BMI 28.42 kg/m²     Physical Exam:  Constitutional: she appears well-developed and well-nourished. she is well groomed. NAD     ASSESSMENT:  1. Chronic migraine    2. Cervical stenosis of spine    3. Neck pain    4. Myofascial pain      PLAN:  - Botox administered in clinic for Chronic Migraine (see below)   - Physical Therapy ordered for chronic neck pain  - Continue all medications as directed  - RTC in 6 weeks for follow-up appointment  - RTC in 11 weeks for repeat Botox injections     Orders Placed This Encounter     Ambulatory Referral to Physical/Occupational Therapy     All questions and concerns addressed.  Patient verbalizes understanding and is agreeable with the above stated treatment plan.      PROCEDURE NOTE:  BOTOX was performed as an indicated therapy for intractable chronic migraine headaches given that the patient failed more than 2 headache medications    Two patient identifiers were confirmed with the patient prior to performing this procedure. A time out to determine correct patient and and agreement on procedure performed was conducted prior to the procedure.      Botulinum Toxin Injection Procedure   Procedure: Chemical neurolysis   After risks and benefits were explained including bleeding, infection, worsening of pain, damage to the areas being injected, weakness of muscles, loss of muscle control, dysphagia if injecting the head or neck, facial droop if injecting the facial area, painful injection, allergic or other reaction to the medications being injected, and the failure of the procedure to help the problem, a signed consent was obtained.   The patient was placed in a comfortable area and the sites to be treated were identified.The area to be treated was prepped three times with alcohol and the alcohol allowed to dry. Next, a 30 gauge needle was used to inject the medication in the area to be treated.      Total Botox used: 155 Units   Botox wastage: 45 Units     Injection sites:    muscle bilaterally ( a total of 10 units divided into 2 sites)   Procerus muscle (5 units)   Frontalis muscle bilaterally (a total of 20 units divided into 4 sites)   Temporalis muscle bilaterally (a total of 40 units divided into 8 sites)   Occipitalis muscle bilaterally (a total of 30 units divided into 6 sites)   Cervical paraspinal muscles (a total of 20 units divided into 4 sites)   Trapezius muscle bilaterally (a total of 30 units divided into 6 sites)   Complications: none   RTC for the next Botox injection:  11 weeks     CC: MD Erika Cohen, P-C  [facility]   [phone]

## 2018-10-18 NOTE — PROGRESS NOTES
Established Patient   SUBJECTIVE:  Patient ID: Massiel Alatorre   Chief Complaint: Follow-up    History of Present Illness:  Massiel Alatorre is a 48 y.o. female who presents to clinic with her  for follow-up of headaches.     Recommendations made at last Office Visit on 9/7/2018:  - Botox administered in clinic for Chronic Migraine (see below)   - Physical Therapy ordered for chronic neck pain  - Continue all medications as directed  - RTC in 6 weeks for follow-up appointment  - RTC in 11 weeks for repeat Botox injections     10/18/2018 - Interval History:  Headaches have persisted, she continues to feel the Botox is giving her some relief as the intensity of her migraines is decreased.  She feels her body has gotten used to the Botox injections, she also feels her body is getting used to doxepin as she is no longer sleeping well, she has an appointment with her rheumatologist on Monday who is prescribing the Doxepin, is interested in switching doxepin back to Amitriptyline, as she took Amitriptyline in the past and did find it was useful with regards to both sleep and migraine prevention.  She has been going to physical therapy regularly, is requesting a referral to neurosurgery for her neck to see what her options may be.      09/07/2018- Interval History:  Patient 30 minutes late for her appointment, discussed would administered Botox today as she drove from [place], however will need to see her back in 6 weeks for follow-up, which she is agreeable to.      06/29/2018- Interval History:  She continues to experience 5 days where she is headache free, prior to Botox, she was not having any headache free days.  Currently complaining of a headache rated 20 out of 10, though she is conversant, pleasant and laughing during clinic visit, well.  She has received GammaCore device, which she does feel has decreased the intensity of her migraines, though it does not fully abort her migraines.  She has continued all  medications as directed.  Has complaints of short term memory disturbances, which is very concerning to her, is considering filing for disability for memory disturbance.  Also complains of chronic pain and fibromyalgia for which she is under the care of Dr. Gomez and Dr. Urbano at [facility] in [place].  MRI C-Spine never done, will have imaging scheduled today. She does not wish to make adjustments to her treatment plan at this time, would like to do one more round of Botox to see if it gives her greater response, if not, will be willing to make adjustments at that time.  Headaches continue to feel the same as they always have, she denies any change in the quality or nature of her headaches.      04/20/2018- Interval History:  Headaches continue to occur nearly everyday, states for the first 6-8 weeks after Botox injections she does notice a decrease in the intensity of her headaches.  She is very happy to report she actually had two headache free days in a row since her last Botox injections.  For the first 4-6 weeks after last Botox injections she was actually having one headache free day per week, which she is happy about.  She can definitely tell when she is due for her next Botox injections, the intensity, frequency and duration of her headaches are significantly worse.  She continues to feel a portion of her headaches and migraines are coming from her neck and she requests to have imaging done of her neck. She does wish to continue with Botox injections for chronic migraine.       Interval History:  In the interim, headaches she has not noticed any difference in her headaches.  Nerve blocks helped for 1-2 days, but then returned at previous intensity and frequency.  Sumatriptan injectable 4 mg did cause the flushing, however not as intense as 6 mg dose, and was effective with aborting her migraine, she is requesting to have sumatriptan pill available as well because she would prefer to not have  to inject herself unless she really  Has to.  Previously had two rounds of Botox, last in August 2017, which were effective in preventing her migraines, she was noticing her headaches were easing off, she was not having as many aura events.  She denies any presence from Botox injections in the past and would like to restart the injections.       Initial ABREU HPI:  47 y.o. female with chronic migraines, anxiety, depression, vitamin D deficiency, and trouble sleeping, who presents to clinic with her significant other for evaluation of headaches.  Headaches previously managed by Neurologist in [place], but  forced her to establish care at alternative facility.    Headaches initially began in her early teenage years, states she has tried numerous medications none of which helped her.  Most recently she started Botox injections, states she had two rounds, after which she was only experiencing 7-8 headaches per month, last round done in August.  Since the middle of October, she has been experiencing a constant migraine all day, everyday.  Pain is described as a piercing or throbbing pain located on the right side of her head.  Onset of migraine very abrupt, over about 20 minutes.  Associated symptoms include nausea, photophobia, phonophobia.  She is currently taking Depakote 1000 mg nightly, Wellbutrin, Lyrica, and doxepin daily for prevention.  In the past she has used sumatriptan injectable for abortive, which she thinks worked, however caused facial flushing.  She would like to restart Botox injections for chronic migraine.       Treatments Tried and Response  Sumatriptan injectable - side effects, but it was effective    Imitrex - some relief   Maxalt - she did get some relief   Elavil - lost effectiveness   Doxepin -   Depakote   Cymbalta -   Topiramate - caused memory problems   Percocet -   Ultram -   Excedrin -   Lyrica -   Botox - helped   Wellbutrin - ?  Prednisone - helps, but causes her to break out  in acne    Current Medications:    ASPIRIN/ACETAMINOPHEN/CAFFEINE (EXCEDRIN MIGRAINE ORAL), Take by mouth 2 (two) times daily as needed., Disp: , Rfl:     buPROPion (WELLBUTRIN XL) 150 MG TB24 tablet, Take 150 mg by mouth once daily., Disp: , Rfl:     cholecalciferol, vitamin D3, 5,000 unit Tab, Take 5,000 Units by mouth once daily., Disp: , Rfl:     diazePAM (VALIUM) 5 MG tablet, Take 5 mg by mouth 2 (two) times daily as needed., Disp: , Rfl:     divalproex ER (DEPAKOTE) 500 MG Tb24, Take 3 tablets (1,500 mg total) by mouth every evening., Disp: 270 tablet, Rfl: 3    doxepin (SILENOR) 6 mg Tab, Take 6 mg by mouth every evening., Disp: , Rfl:     ergocalciferol (VITAMIN D2) 50,000 unit Cap, Take 150,000 Units by mouth every 7 days., Disp: , Rfl:     hydrOXYzine pamoate (VISTARIL) 50 MG Cap, Take 1 capsule 60 minutes prior to imaging, may repeat dose in 1 hour if needed.  Must secure ride to and from MRI., Disp: 2 capsule, Rfl: 0    pregabalin (LYRICA) 150 MG capsule, Take 150 mg by mouth 2 (two) times daily. , Disp: , Rfl:     sumatriptan (IMITREX) 100 MG tablet, 1 tab at onset of migraine, may repeat in 2 hrs if needed. No more than 2 tabs/day or 3 days/week., Disp: 9 tablet, Rfl: 11    SUMAtriptan succinate (IMITREX) 4 mg/0.5 mL PnIj, Inject into skin at onset of migraine, may repeat dose in 1 hour if needed. No more than 12 mg/day. No more than 3 days per week., Disp: 12 each, Rfl: 11    traMADol (ULTRAM) 50 mg tablet, Take 50 mg by mouth 2 (two) times daily as needed., Disp: , Rfl:     erenumab-aooe (AIMOVIG AUTOINJECTOR) 70 mg/mL AtIn, Inject 2 mLs (140 mg total) into the skin every 28 days., Disp: 2 mL, Rfl: 11    Current Facility-Administered Medications:     onabotulinumtoxina injection 200 Units, 200 Units, Intramuscular, Q90 Days, SHMUEL Irizarry, 200 Units at 09/07/18 1152    onabotulinumtoxina injection 200 Units, 200 Units, Intramuscular, Q90 Days, SHMUEL Irizarry     onabotulinumtoxina injection 200 Units, 200 Units, Intramuscular, Q90 Days, SHMUEL Irizarry    Review of Systems - as per HPI, otherwise a balanced 10 systems review is negative.    OBJECTIVE:  Vitals:  BP (!) 92/59 (BP Location: Right arm, Patient Position: Sitting, BP Method: Large (Automatic))   Pulse 85   Ht 5' (1.524 m)   Wt 65.5 kg (144 lb 6.4 oz)   BMI 28.20 kg/m²      Physical Exam:  Constitutional: She appears well-developed and well-nourished. She is well groomed. NAD.    HENT:    Head: Normocephalic and atraumatic  Eyes: Conjunctivae and EOM are normal. Pupils are equal, round, and reactive to light   Musculoskeletal: Normal range of motion. No joint stiffness.   Skin: Skin is warm and dry.  Psychiatric: Normal mood and affect.  Neuro: Patient is alert and oriented to person, place and time.  Speech is clear and fluent. Recent and remote memory intact.  Moves all 4 extremities against gravity. Gait and station normal.      ASSESSMENT:  1. Chronic migraine    2. Intractable migraine without aura and without status migrainosus    3. Cervical stenosis of spine    4. Neck pain    5. Fibromyalgia      PLAN:  - Start Aimovig 140 mg SQ monthly injectable   - Continue Lyrica, Depakote, and Wellbutrin for migraine prevention   - refilled sumatriptan PO and SQ monthly injection for migraine abortive   - Continue tracking headaches   - Discussed goals of therapy are to decrease the frequency, intensity, and duration of headaches  - Referral to Neurosurgery per patient's request   - Fibromyalgia - continue regular f/up with Rheumatology, has appointment on Monday, discussed am okay with changing doxepin to amitriptyline   - Follow up in 6 weeks for next round of Botox      Orders Placed This Encounter    Ambulatory Referral to Neurosurgery    erenumab-aooe (AIMOVIG AUTOINJECTOR) 70 mg/mL AtIn    SUMAtriptan succinate (IMITREX) 4 mg/0.5 mL PnIj    sumatriptan (IMITREX) 100 MG tablet    divalproex ER  (DEPAKOTE) 500 MG Tb24     Counseling:  I spent 27 minutes face-to-face with the patient with > 50% of the time spent counseling and educating regarding the results of the data, diagnosis, and recommendations stated above, the risks, benefits, and potential side effects of the medications, and the future plan of care.  Questions and concerns were sought and answered to the patient's stated verbal satisfaction.  The patient verbalizes understanding and agreement with the above stated treatment plan.     CC: MD Erika Cohen, FNP-C  [facility]   [phone]    Dr. Guevara was available during today's encounter.

## 2018-10-19 ENCOUNTER — OFFICE VISIT (OUTPATIENT)
Dept: NEUROLOGY | Facility: CLINIC | Age: 48
End: 2018-10-19
Payer: OTHER GOVERNMENT

## 2018-10-19 ENCOUNTER — PATIENT MESSAGE (OUTPATIENT)
Dept: NEUROLOGY | Facility: CLINIC | Age: 48
End: 2018-10-19

## 2018-10-19 ENCOUNTER — TELEPHONE (OUTPATIENT)
Dept: PHARMACY | Facility: CLINIC | Age: 48
End: 2018-10-19

## 2018-10-19 VITALS
HEART RATE: 85 BPM | WEIGHT: 144.38 LBS | DIASTOLIC BLOOD PRESSURE: 59 MMHG | BODY MASS INDEX: 28.35 KG/M2 | HEIGHT: 60 IN | SYSTOLIC BLOOD PRESSURE: 92 MMHG

## 2018-10-19 DIAGNOSIS — M48.02 CERVICAL STENOSIS OF SPINE: ICD-10-CM

## 2018-10-19 DIAGNOSIS — G43.019 INTRACTABLE MIGRAINE WITHOUT AURA AND WITHOUT STATUS MIGRAINOSUS: ICD-10-CM

## 2018-10-19 DIAGNOSIS — M54.2 NECK PAIN: ICD-10-CM

## 2018-10-19 DIAGNOSIS — M79.7 FIBROMYALGIA: ICD-10-CM

## 2018-10-19 PROCEDURE — 99999 PR PBB SHADOW E&M-EST. PATIENT-LVL III: CPT | Mod: PBBFAC,,, | Performed by: NURSE PRACTITIONER

## 2018-10-19 PROCEDURE — 99214 OFFICE O/P EST MOD 30 MIN: CPT | Mod: S$PBB,,, | Performed by: NURSE PRACTITIONER

## 2018-10-19 PROCEDURE — 99213 OFFICE O/P EST LOW 20 MIN: CPT | Mod: PBBFAC | Performed by: NURSE PRACTITIONER

## 2018-10-19 RX ORDER — DIVALPROEX SODIUM 500 MG/1
1500 TABLET, FILM COATED, EXTENDED RELEASE ORAL NIGHTLY
Qty: 270 TABLET | Refills: 3 | Status: SHIPPED | OUTPATIENT
Start: 2018-10-19 | End: 2019-10-29 | Stop reason: SDUPTHER

## 2018-10-19 RX ORDER — SUMATRIPTAN SUCCINATE 4 MG/.5ML
INJECTION, SOLUTION SUBCUTANEOUS
Qty: 12 EACH | Refills: 11 | Status: SHIPPED | OUTPATIENT
Start: 2018-10-19 | End: 2018-11-29 | Stop reason: DRUGHIGH

## 2018-10-19 RX ORDER — ERGOCALCIFEROL 1.25 MG/1
150000 CAPSULE ORAL
COMMUNITY

## 2018-10-19 RX ORDER — DIVALPROEX SODIUM 500 MG/1
1500 TABLET, FILM COATED, EXTENDED RELEASE ORAL NIGHTLY
Qty: 270 TABLET | Refills: 3 | Status: SHIPPED | OUTPATIENT
Start: 2018-10-19 | End: 2018-10-19 | Stop reason: SDUPTHER

## 2018-10-19 RX ORDER — SUMATRIPTAN SUCCINATE 100 MG/1
TABLET ORAL
Qty: 9 TABLET | Refills: 11 | Status: SHIPPED | OUTPATIENT
Start: 2018-10-19 | End: 2018-11-29 | Stop reason: DRUGHIGH

## 2018-10-22 NOTE — TELEPHONE ENCOUNTER
FYI:  Aimovig prior authorization has been approved through 02/19/19 . Patient's insurance requires the patient to fill through [facility]. Please send prescription to [facility], which has been added to the patients EPIC profile. Patient has been notified and provided with the necessary info to call and schedule a delivery.    To complete this in EPIC, the original order MUST be discontinued and re-typed as a new prescription with the updated pharmacy listed. Clicking reorder will continue to route the rx to [facility] even if the pharmacy is changed. Please opt the patient out of [facility] when the BPA is fired.    Thanks,   Scarlett EGAN

## 2018-10-26 ENCOUNTER — PATIENT MESSAGE (OUTPATIENT)
Dept: NEUROLOGY | Facility: CLINIC | Age: 48
End: 2018-10-26

## 2018-10-26 DIAGNOSIS — M54.2 NECK PAIN: ICD-10-CM

## 2018-10-26 DIAGNOSIS — M48.02 CERVICAL STENOSIS OF SPINE: Primary | ICD-10-CM

## 2018-10-26 DIAGNOSIS — G44.86 CERVICOGENIC HEADACHE: ICD-10-CM

## 2018-10-29 ENCOUNTER — TELEPHONE (OUTPATIENT)
Dept: NEUROSURGERY | Facility: CLINIC | Age: 48
End: 2018-10-29

## 2018-11-29 ENCOUNTER — PROCEDURE VISIT (OUTPATIENT)
Dept: NEUROLOGY | Facility: CLINIC | Age: 48
End: 2018-11-29
Payer: OTHER GOVERNMENT

## 2018-11-29 VITALS
SYSTOLIC BLOOD PRESSURE: 129 MMHG | HEART RATE: 71 BPM | DIASTOLIC BLOOD PRESSURE: 69 MMHG | BODY MASS INDEX: 27.19 KG/M2 | WEIGHT: 144 LBS | HEIGHT: 61 IN

## 2018-11-29 DIAGNOSIS — M79.7 FIBROMYALGIA: ICD-10-CM

## 2018-11-29 DIAGNOSIS — F32.A DEPRESSION, UNSPECIFIED DEPRESSION TYPE: ICD-10-CM

## 2018-11-29 DIAGNOSIS — M54.2 NECK PAIN: ICD-10-CM

## 2018-11-29 DIAGNOSIS — G47.9 TROUBLE IN SLEEPING: ICD-10-CM

## 2018-11-29 DIAGNOSIS — G43.019 INTRACTABLE MIGRAINE WITHOUT AURA AND WITHOUT STATUS MIGRAINOSUS: ICD-10-CM

## 2018-11-29 PROCEDURE — 64615 CHEMODENERV MUSC MIGRAINE: CPT | Mod: PBBFAC | Performed by: NURSE PRACTITIONER

## 2018-11-29 PROCEDURE — 64615 CHEMODENERV MUSC MIGRAINE: CPT | Mod: S$PBB,,, | Performed by: NURSE PRACTITIONER

## 2018-11-29 RX ORDER — SUMATRIPTAN 50 MG/1
TABLET, FILM COATED ORAL
Qty: 12 TABLET | Refills: 5 | Status: SHIPPED | OUTPATIENT
Start: 2018-11-29 | End: 2019-05-15 | Stop reason: SDUPTHER

## 2018-11-29 RX ORDER — CEFUROXIME AXETIL 250 MG/1
TABLET ORAL
Qty: 12 ML | Refills: 5 | Status: SHIPPED | OUTPATIENT
Start: 2018-11-29 | End: 2019-10-29 | Stop reason: SDUPTHER

## 2018-11-29 RX ORDER — SUMATRIPTAN 50 MG/1
TABLET, FILM COATED ORAL
Qty: 12 TABLET | Refills: 5 | Status: SHIPPED | OUTPATIENT
Start: 2018-11-29 | End: 2018-11-29 | Stop reason: SDUPTHER

## 2018-11-29 RX ADMIN — ONABOTULINUMTOXINA 200 UNITS: 100 INJECTION, POWDER, LYOPHILIZED, FOR SOLUTION INTRADERMAL; INTRAMUSCULAR at 11:11

## 2018-11-29 NOTE — PROCEDURES
SUBJECTIVE:  Patient ID: Massiel Alatorre  Chief Complaint: Follow-up and Botulinum Toxin Injection    History of Present Illness:  Massiel Alatorre is a 48 y.o. female who presents to clinic with her  for follow-up of headaches and Botox injections.     Recommendations made at last Office Visit on 10/18/2018:  - Start Aimovig 140 mg SQ monthly injectable   - Continue Lyrica, Depakote, and Wellbutrin for migraine prevention   - refilled sumatriptan PO and SQ monthly injection for migraine abortive   - Continue tracking headaches   - Discussed goals of therapy are to decrease the frequency, intensity, and duration of headaches  - Referral to Neurosurgery per patient's request   - Fibromyalgia - continue regular f/up with Rheumatology, has appointment on Monday, discussed am okay with changing doxepin to amitriptyline   - Follow up in 6 weeks for next round of Botox      11/29/2018- Interval History:  She has started Aimovig 140 mg SQ injections, she has not noticed any change in her migraines since starting Aimovig.  She was seen by her Rheumatologist who switched doxepin to Amitriptyline, she is waiting for prescription from her pharmacy prior to beginning injections.  She has continued going to physical therapy regularly, is still looking for a Neurosurgeon to see for her neck pain closer to her home as her insurance would not approve her seeing Neurosurgery at [facility].  She does wish to continue with Botox injections for chronic migraine. Additionally, she is requesting alternative abortive therapy as she has been having a hard time getting sumatriptan 100 mg tabs from her pharmacy as well as sumatriptan 4 mg SQ injections, was told by her pharmacy that both medications were on back order.  She denies any change in the quality or nature of her migraines.     10/18/2018 - Interval History:  Headaches have persisted, she continues to feel the Botox is giving her some relief as the intensity of her migraines is  decreased.  She feels her body has gotten used to the Botox injections, she also feels her body is getting used to doxepin as she is no longer sleeping well, she has an appointment with her rheumatologist on Monday who is prescribing the Doxepin, is interested in switching doxepin back to Amitriptyline, as she took Amitriptyline in the past and did find it was useful with regards to both sleep and migraine prevention.  She has been going to physical therapy regularly, is requesting a referral to neurosurgery for her neck to see what her options may be.       09/07/2018- Interval History:  Patient 30 minutes late for her appointment, discussed would administered Botox today as she drove from [place], however will need to see her back in 6 weeks for follow-up, which she is agreeable to.       06/29/2018- Interval History:  She continues to experience 5 days where she is headache free, prior to Botox, she was not having any headache free days.  Currently complaining of a headache rated 20 out of 10, though she is conversant, pleasant and laughing during clinic visit, well.  She has received GammaCore device, which she does feel has decreased the intensity of her migraines, though it does not fully abort her migraines.  She has continued all medications as directed.  Has complaints of short term memory disturbances, which is very concerning to her, is considering filing for disability for memory disturbance.  Also complains of chronic pain and fibromyalgia for which she is under the care of Dr. Gomez and Dr. Urbano at [facility] in [place].  MRI C-Spine never done, will have imaging scheduled today. She does not wish to make adjustments to her treatment plan at this time, would like to do one more round of Botox to see if it gives her greater response, if not, will be willing to make adjustments at that time.  Headaches continue to feel the same as they always have, she denies any change in the quality  or nature of her headaches.      04/20/2018- Interval History:  Headaches continue to occur nearly everyday, states for the first 6-8 weeks after Botox injections she does notice a decrease in the intensity of her headaches.  She is very happy to report she actually had two headache free days in a row since her last Botox injections.  For the first 4-6 weeks after last Botox injections she was actually having one headache free day per week, which she is happy about.  She can definitely tell when she is due for her next Botox injections, the intensity, frequency and duration of her headaches are significantly worse.  She continues to feel a portion of her headaches and migraines are coming from her neck and she requests to have imaging done of her neck. She does wish to continue with Botox injections for chronic migraine.       Interval History:  In the interim, headaches she has not noticed any difference in her headaches.  Nerve blocks helped for 1-2 days, but then returned at previous intensity and frequency.  Sumatriptan injectable 4 mg did cause the flushing, however not as intense as 6 mg dose, and was effective with aborting her migraine, she is requesting to have sumatriptan pill available as well because she would prefer to not have to inject herself unless she really  Has to.  Previously had two rounds of Botox, last in August 2017, which were effective in preventing her migraines, she was noticing her headaches were easing off, she was not having as many aura events.  She denies any presence from Botox injections in the past and would like to restart the injections.       Initial ABREU HPI:  47 y.o. female with chronic migraines, anxiety, depression, vitamin D deficiency, and trouble sleeping, who presents to clinic with her significant other for evaluation of headaches.  Headaches previously managed by Neurologist in [place], but  forced her to establish care at alternative facility.    Headaches  initially began in her early teenage years, states she has tried numerous medications none of which helped her.  Most recently she started Botox injections, states she had two rounds, after which she was only experiencing 7-8 headaches per month, last round done in August.  Since the middle of October, she has been experiencing a constant migraine all day, everyday.  Pain is described as a piercing or throbbing pain located on the right side of her head.  Onset of migraine very abrupt, over about 20 minutes.  Associated symptoms include nausea, photophobia, phonophobia.  She is currently taking Depakote 1000 mg nightly, Wellbutrin, Lyrica, and doxepin daily for prevention.  In the past she has used sumatriptan injectable for abortive, which she thinks worked, however caused facial flushing.  She would like to restart Botox injections for chronic migraine.       Treatments Tried and Response  Sumatriptan injectable - side effects, but it was effective    Imitrex - some relief   Maxalt - she did get some relief   Elavil - lost effectiveness   Doxepin -   Depakote   Cymbalta -   Topiramate - caused memory problems   Percocet -   Ultram -   Excedrin -   Lyrica -   Botox - helped   Wellbutrin - ?  Prednisone - helps, but causes her to break out in acne  Aimovig - no help after 2 rounds     Current Medications:    ASPIRIN/ACETAMINOPHEN/CAFFEINE (EXCEDRIN MIGRAINE ORAL), Take by mouth 2 (two) times daily as needed., Disp: , Rfl:     buPROPion (WELLBUTRIN XL) 150 MG TB24 tablet, Take 150 mg by mouth once daily., Disp: , Rfl:     cholecalciferol, vitamin D3, 5,000 unit Tab, Take 5,000 Units by mouth once daily., Disp: , Rfl:     diazePAM (VALIUM) 5 MG tablet, Take 5 mg by mouth 2 (two) times daily as needed., Disp: , Rfl:     divalproex ER (DEPAKOTE) 500 MG Tb24, Take 3 tablets (1,500 mg total) by mouth every evening., Disp: 270 tablet, Rfl: 3    doxepin (SILENOR) 6 mg Tab, Take 6 mg by mouth every evening., Disp: ,  "Rfl:     erenumab-aooe (AIMOVIG AUTOINJECTOR) 70 mg/mL AtIn, Inject 2 mLs (140 mg total) into the skin every 28 days., Disp: 2 mL, Rfl: 11    ergocalciferol (VITAMIN D2) 50,000 unit Cap, Take 150,000 Units by mouth every 7 days., Disp: , Rfl:     hydrOXYzine pamoate (VISTARIL) 50 MG Cap, Take 1 capsule 60 minutes prior to imaging, may repeat dose in 1 hour if needed.  Must secure ride to and from MRI., Disp: 2 capsule, Rfl: 0    pregabalin (LYRICA) 150 MG capsule, Take 150 mg by mouth 2 (two) times daily. , Disp: , Rfl:     sumatriptan (IMITREX) 50 MG tablet, 1 tab at onset of migraine, may repeat in 2 hrs if needed. No more than 2 tabs/day or 3 days/week., Disp: 12 tablet, Rfl: 5    traMADol (ULTRAM) 50 mg tablet, Take 50 mg by mouth 2 (two) times daily as needed., Disp: , Rfl:     sumatriptan (IMITREX STATDOSE) 6 mg/0.5 mL kit, Inject into skin at onset of migraine, may repeat dose in 1 hour if needed. Max 2 injections/day. Max 3 days/week., Disp: 12 mL, Rfl: 5    Current Facility-Administered Medications:     onabotulinumtoxina injection 200 Units, 200 Units, Intramuscular, Q90 Days, Erika Vulevich, FNP, 200 Units at 09/07/18 1152    onabotulinumtoxina injection 200 Units, 200 Units, Intramuscular, Q90 Days, Erika Vulevich, FNP    onabotulinumtoxina injection 200 Units, 200 Units, Intramuscular, Q90 Days, Erika Vulevich, FNP, 200 Units at 11/29/18 1127    onabotulinumtoxina injection 200 Units, 200 Units, Intramuscular, Q90 Days, Erika Vulevich, FNP    Review of Systems - as per HPI, otherwise a balanced 10 systems review is negative.    OBJECTIVE:  Vitals:  /69   Pulse 71   Ht 5' 1" (1.549 m)   Wt 65.3 kg (144 lb)   BMI 27.21 kg/m²     Physical Exam:  Constitutional: she appears well-developed and well-nourished. she is well groomed. NAD   HENT:    Head: Normocephalic and atraumatic  Eyes: Conjunctivae and EOM are normal  Musculoskeletal: Normal range of motion. No joint " stiffness.   Skin: Skin is warm and dry.  Psychiatric: Mood and affect are normal    Neuro: Patient is alert and oriented to person, place, and time. Language is intact and fluent.  Recent and remote memory are intact.  Normal attention and concentration.  Facial movement is symmetric.  Gait is normal.     ASSESSMENT:  1. Chronic migraine    2. Intractable migraine without aura and without status migrainosus    3. Neck pain    4. Fibromyalgia    5. Depression, unspecified depression type    6. Trouble in sleeping      PLAN:  - Botox administered in clinic for Chronic Migraine (see below)   - Continue Aimovig 140 mg SQ monthly injections   - Continue Lyrica, depakote, wellbutrin, and amitriptyline daily   - For migraine abortive - given sumatriptan 50 mg tabs and sumatriptan 6 mg SQ injectable   - Fibromyalgia - management per Rheumatology, continue regular f/up   - Depression - currently on wellbutrin daily, management per primary care provider   - Trouble in sleep - discussed amitriptyline can help with sleep   - RTC in 12 weeks for repeat Botox injections or sooner if needed     Orders Placed This Encounter    sumatriptan (IMITREX STATDOSE) 6 mg/0.5 mL kit    sumatriptan (IMITREX) 50 MG tablet       All questions and concerns addressed.  Patient verbalizes understanding and is agreeable with the above stated treatment plan.      PROCEDURE NOTE:  BOTOX was performed as an indicated therapy for intractable chronic migraine headaches given that the patient failed more than 2 headache medications    Two patient identifiers were confirmed with the patient prior to performing this procedure. A time out to determine correct patient and and agreement on procedure performed was conducted prior to the procedure.      Botulinum Toxin Injection Procedure   Procedure: Chemical neurolysis   After risks and benefits were explained including bleeding, infection, worsening of pain, damage to the areas being injected, weakness of  muscles, loss of muscle control, dysphagia if injecting the head or neck, facial droop if injecting the facial area, painful injection, allergic or other reaction to the medications being injected, and the failure of the procedure to help the problem, a signed consent was obtained.   The patient was placed in a comfortable area and the sites to be treated were identified.The area to be treated was prepped three times with alcohol and the alcohol allowed to dry. Next, a 30 gauge needle was used to inject the medication in the area to be treated.      Total Botox used: 155 Units   Botox wastage: 45 Units     Injection sites:    muscle bilaterally ( a total of 10 units divided into 2 sites)   Procerus muscle (5 units)   Frontalis muscle bilaterally (a total of 20 units divided into 4 sites)   Temporalis muscle bilaterally (a total of 40 units divided into 8 sites)   Occipitalis muscle bilaterally (a total of 30 units divided into 6 sites)   Cervical paraspinal muscles (a total of 20 units divided into 4 sites)   Trapezius muscle bilaterally (a total of 30 units divided into 6 sites)   Complications: none   RTC for the next Botox injection: 12 weeks     CC: MD Erika Cohen FNP-C [facility] Department [facility]   [phone]

## 2019-01-07 ENCOUNTER — PATIENT MESSAGE (OUTPATIENT)
Dept: NEUROLOGY | Facility: CLINIC | Age: 49
End: 2019-01-07

## 2019-01-07 DIAGNOSIS — M48.02 CERVICAL STENOSIS OF SPINE: Primary | ICD-10-CM

## 2019-02-22 ENCOUNTER — PROCEDURE VISIT (OUTPATIENT)
Dept: NEUROLOGY | Facility: CLINIC | Age: 49
End: 2019-02-22
Payer: OTHER GOVERNMENT

## 2019-02-22 VITALS
BODY MASS INDEX: 28.47 KG/M2 | WEIGHT: 150.81 LBS | HEIGHT: 61 IN | SYSTOLIC BLOOD PRESSURE: 109 MMHG | DIASTOLIC BLOOD PRESSURE: 77 MMHG | HEART RATE: 90 BPM

## 2019-02-22 DIAGNOSIS — F32.A DEPRESSION, UNSPECIFIED DEPRESSION TYPE: ICD-10-CM

## 2019-02-22 DIAGNOSIS — G47.9 TROUBLE IN SLEEPING: ICD-10-CM

## 2019-02-22 DIAGNOSIS — M79.7 FIBROMYALGIA: ICD-10-CM

## 2019-02-22 DIAGNOSIS — G43.019 INTRACTABLE MIGRAINE WITHOUT AURA AND WITHOUT STATUS MIGRAINOSUS: ICD-10-CM

## 2019-02-22 PROCEDURE — 64615 PR CHEMODENERVATION OF MUSCLE FOR CHRONIC MIGRAINE: ICD-10-PCS | Mod: S$PBB,,, | Performed by: NURSE PRACTITIONER

## 2019-02-22 PROCEDURE — 64615 CHEMODENERV MUSC MIGRAINE: CPT | Mod: PBBFAC | Performed by: NURSE PRACTITIONER

## 2019-02-22 PROCEDURE — 64615 CHEMODENERV MUSC MIGRAINE: CPT | Mod: S$PBB,,, | Performed by: NURSE PRACTITIONER

## 2019-02-22 RX ORDER — AMITRIPTYLINE HYDROCHLORIDE 25 MG/1
25 TABLET, FILM COATED ORAL NIGHTLY PRN
COMMUNITY
End: 2019-10-29 | Stop reason: SDUPTHER

## 2019-02-22 RX ADMIN — ONABOTULINUMTOXINA 200 UNITS: 100 INJECTION, POWDER, LYOPHILIZED, FOR SOLUTION INTRADERMAL; INTRAMUSCULAR at 11:02

## 2019-02-22 NOTE — PROCEDURES
SUBJECTIVE:  Patient ID: Massiel Alatorre  Chief Complaint: Follow-up and Botulinum Toxin Injection    History of Present Illness:  Massiel Alatorre is a 48 y.o. female who presents to clinic with her  for follow-up of headaches and Botox injections.     Recommendations made at last Office Visit on 11/29/2018:  - Botox administered in clinic for Chronic Migraine (see below)   - Continue Aimovig 140 mg SQ monthly injections   - Continue Lyrica, depakote, wellbutrin, and amitriptyline daily   - For migraine abortive - given sumatriptan 50 mg tabs and sumatriptan 6 mg SQ injectable   - Fibromyalgia - management per Rheumatology, continue regular f/up   - Depression - currently on wellbutrin daily, management per primary care provider   - Trouble in sleep - discussed amitriptyline can help with sleep   - RTC in 12 weeks for repeat Botox injections or sooner if needed     02/22/2019- Interval History:  She has had 7 full days over the last twelve weeks headache free.  She does feel her headaches have been getting better with dual Aimovig and Botox therapy and wishes to continue with Botox injections.  She has continued taking depakote, wellbutrin and amitriptyline daily.  Has been using either sumatriptan PO or injectable for migraine abortive.  Migraines continue to feel the same as they always have, she denies any change in the quality or nature of her migraines.     11/29/2018- Interval History:  She has started Aimovig 140 mg SQ injections, she has not noticed any change in her migraines since starting Aimovig.  She was seen by her Rheumatologist who switched doxepin to Amitriptyline, she is waiting for prescription from her pharmacy prior to beginning injections.  She has continued going to physical therapy regularly, is still looking for a Neurosurgeon to see for her neck pain closer to her home as her insurance would not approve her seeing Neurosurgery at [facility].  She does wish to continue with Botox injections for  chronic migraine. Additionally, she is requesting alternative abortive therapy as she has been having a hard time getting sumatriptan 100 mg tabs from her pharmacy as well as sumatriptan 4 mg SQ injections, was told by her pharmacy that both medications were on back order.  She denies any change in the quality or nature of her migraines.      10/18/2018 - Interval History:  Headaches have persisted, she continues to feel the Botox is giving her some relief as the intensity of her migraines is decreased.  She feels her body has gotten used to the Botox injections, she also feels her body is getting used to doxepin as she is no longer sleeping well, she has an appointment with her rheumatologist on Monday who is prescribing the Doxepin, is interested in switching doxepin back to Amitriptyline, as she took Amitriptyline in the past and did find it was useful with regards to both sleep and migraine prevention.  She has been going to physical therapy regularly, is requesting a referral to neurosurgery for her neck to see what her options may be.       09/07/2018- Interval History:  Patient 30 minutes late for her appointment, discussed would administered Botox today as she drove from [place], however will need to see her back in 6 weeks for follow-up, which she is agreeable to.       06/29/2018- Interval History:  She continues to experience 5 days where she is headache free, prior to Botox, she was not having any headache free days.  Currently complaining of a headache rated 20 out of 10, though she is conversant, pleasant and laughing during clinic visit, well.  She has received GammaCore device, which she does feel has decreased the intensity of her migraines, though it does not fully abort her migraines.  She has continued all medications as directed.  Has complaints of short term memory disturbances, which is very concerning to her, is considering filing for disability for memory disturbance.  Also complains of  chronic pain and fibromyalgia for which she is under the care of Dr. Gomez and Dr. Urbano at [facility] in [place].  MRI C-Spine never done, will have imaging scheduled today. She does not wish to make adjustments to her treatment plan at this time, would like to do one more round of Botox to see if it gives her greater response, if not, will be willing to make adjustments at that time.  Headaches continue to feel the same as they always have, she denies any change in the quality or nature of her headaches.      04/20/2018- Interval History:  Headaches continue to occur nearly everyday, states for the first 6-8 weeks after Botox injections she does notice a decrease in the intensity of her headaches.  She is very happy to report she actually had two headache free days in a row since her last Botox injections.  For the first 4-6 weeks after last Botox injections she was actually having one headache free day per week, which she is happy about.  She can definitely tell when she is due for her next Botox injections, the intensity, frequency and duration of her headaches are significantly worse.  She continues to feel a portion of her headaches and migraines are coming from her neck and she requests to have imaging done of her neck. She does wish to continue with Botox injections for chronic migraine.       Interval History:  In the interim, headaches she has not noticed any difference in her headaches.  Nerve blocks helped for 1-2 days, but then returned at previous intensity and frequency.  Sumatriptan injectable 4 mg did cause the flushing, however not as intense as 6 mg dose, and was effective with aborting her migraine, she is requesting to have sumatriptan pill available as well because she would prefer to not have to inject herself unless she really  Has to.  Previously had two rounds of Botox, last in August 2017, which were effective in preventing her migraines, she was noticing her headaches were  easing off, she was not having as many aura events.  She denies any presence from Botox injections in the past and would like to restart the injections.       Initial ABREU HPI:  47 y.o. female with chronic migraines, anxiety, depression, vitamin D deficiency, and trouble sleeping, who presents to clinic with her significant other for evaluation of headaches.  Headaches previously managed by Neurologist in [place], but  forced her to establish care at alternative facility.    Headaches initially began in her early teenage years, states she has tried numerous medications none of which helped her.  Most recently she started Botox injections, states she had two rounds, after which she was only experiencing 7-8 headaches per month, last round done in August.  Since the middle of October, she has been experiencing a constant migraine all day, everyday.  Pain is described as a piercing or throbbing pain located on the right side of her head.  Onset of migraine very abrupt, over about 20 minutes.  Associated symptoms include nausea, photophobia, phonophobia.  She is currently taking Depakote 1000 mg nightly, Wellbutrin, Lyrica, and doxepin daily for prevention.  In the past she has used sumatriptan injectable for abortive, which she thinks worked, however caused facial flushing.  She would like to restart Botox injections for chronic migraine.       Treatments Tried and Response  Sumatriptan injectable - side effects, but it was effective    Imitrex - some relief   Maxalt - she did get some relief   Elavil - lost effectiveness   Doxepin -   Depakote   Cymbalta -   Topiramate - caused memory problems   Percocet -   Ultram -   Excedrin -   Lyrica -   Botox - helped   Wellbutrin - ?  Prednisone - helps, but causes her to break out in acne  Aimovig - helping     Current Medications:    amitriptyline (ELAVIL) 25 MG tablet, Take 25 mg by mouth nightly as needed for Insomnia., Disp: , Rfl:      ASPIRIN/ACETAMINOPHEN/CAFFEINE (EXCEDRIN MIGRAINE ORAL), Take by mouth 2 (two) times daily as needed., Disp: , Rfl:     buPROPion (WELLBUTRIN XL) 150 MG TB24 tablet, Take 150 mg by mouth once daily., Disp: , Rfl:     cholecalciferol, vitamin D3, 5,000 unit Tab, Take 5,000 Units by mouth once daily., Disp: , Rfl:     diazePAM (VALIUM) 5 MG tablet, Take 5 mg by mouth 2 (two) times daily as needed., Disp: , Rfl:     divalproex ER (DEPAKOTE) 500 MG Tb24, Take 3 tablets (1,500 mg total) by mouth every evening., Disp: 270 tablet, Rfl: 3    erenumab-aooe (AIMOVIG AUTOINJECTOR) 70 mg/mL AtIn, Inject 2 mLs (140 mg total) into the skin every 28 days., Disp: 2 mL, Rfl: 11    ergocalciferol (VITAMIN D2) 50,000 unit Cap, Take 150,000 Units by mouth every 7 days., Disp: , Rfl:     hydrOXYzine pamoate (VISTARIL) 50 MG Cap, Take 1 capsule 60 minutes prior to imaging, may repeat dose in 1 hour if needed.  Must secure ride to and from MRI., Disp: 2 capsule, Rfl: 0    pregabalin (LYRICA) 150 MG capsule, Take 150 mg by mouth 2 (two) times daily. , Disp: , Rfl:     sumatriptan (IMITREX STATDOSE) 6 mg/0.5 mL kit, Inject into skin at onset of migraine, may repeat dose in 1 hour if needed. Max 2 injections/day. Max 3 days/week., Disp: 12 mL, Rfl: 5    sumatriptan (IMITREX) 50 MG tablet, 1 tab at onset of migraine, may repeat in 2 hrs if needed. No more than 2 tabs/day or 3 days/week., Disp: 12 tablet, Rfl: 5    traMADol (ULTRAM) 50 mg tablet, Take 50 mg by mouth 2 (two) times daily as needed., Disp: , Rfl:     Current Facility-Administered Medications:     onabotulinumtoxina injection 200 Units, 200 Units, Intramuscular, Q90 Days, SHMUEL Irizarry, 200 Units at 09/07/18 1152    onabotulinumtoxina injection 200 Units, 200 Units, Intramuscular, Q90 Days, SHMUEL Irizarry    onabotulinumtoxina injection 200 Units, 200 Units, Intramuscular, Q90 Days, SHMUEL Irizarry, 200 Units at 11/29/18 1127     "onabotulinumtoxina injection 200 Units, 200 Units, Intramuscular, Q90 Days, Erika Watkins, FNSHAHNAZ, 200 Units at 02/22/19 1124    Review of Systems - as per HPI, otherwise a balanced 10 systems review is negative.    OBJECTIVE:  Vitals:  /77 (BP Location: Left arm, Patient Position: Sitting, BP Method: Large (Automatic))   Pulse 90   Ht 5' 1" (1.549 m)   Wt 68.4 kg (150 lb 12.7 oz)   BMI 28.49 kg/m²     Physical Exam:  Constitutional: she appears well-developed and well-nourished. she is well groomed. NAD   HENT:    Head: Normocephalic and atraumatic  Eyes: Conjunctivae and EOM are normal  Musculoskeletal: Normal range of motion. No joint stiffness.   Skin: Skin is warm and dry.  Psychiatric: Mood and affect are normal    Neuro: Patient is alert and oriented to person, place, and time. Language is intact and fluent.  Recent and remote memory are intact.  Normal attention and concentration.  Facial movement is symmetric.  Gait is normal.     ASSESSMENT:  1. Chronic migraine    2. Intractable migraine without aura and without status migrainosus    3. Fibromyalgia    4. Depression, unspecified depression type    5. Trouble in sleeping      PLAN:  - Botox administered in clinic for Chronic Migraine (see below)   - Continue Aimovig 140 mg SQ monthly injections   - Continue Lyrica, depakote, wellbutrin, and amitriptyline daily   - For migraine abortive - given sumatriptan 50 mg tabs and sumatriptan 6 mg SQ injectable   - Fibromyalgia - management per Rheumatology, continue regular f/up   - Depression - currently on wellbutrin daily, management per primary care provider   - Trouble in sleep - discussed amitriptyline can help with sleep   - RTC in 12 weeks for repeat Botox injections or sooner if needed     All questions and concerns addressed.  Patient verbalizes understanding and is agreeable with the above stated treatment plan.      PROCEDURE NOTE:  BOTOX was performed as an indicated therapy for intractable " chronic migraine headaches given that the patient failed more than 2 headache medications    Two patient identifiers were confirmed with the patient prior to performing this procedure. A time out to determine correct patient and and agreement on procedure performed was conducted prior to the procedure.      Botulinum Toxin Injection Procedure   Procedure: Chemical neurolysis   After risks and benefits were explained including bleeding, infection, worsening of pain, damage to the areas being injected, weakness of muscles, loss of muscle control, dysphagia if injecting the head or neck, facial droop if injecting the facial area, painful injection, allergic or other reaction to the medications being injected, and the failure of the procedure to help the problem, a signed consent was obtained.   The patient was placed in a comfortable area and the sites to be treated were identified.The area to be treated was prepped three times with alcohol and the alcohol allowed to dry. Next, a 30 gauge needle was used to inject the medication in the area to be treated.      Total Botox used: 155 Units   Botox wastage: 45 Units     Injection sites:    muscle bilaterally ( a total of 10 units divided into 2 sites)   Procerus muscle (5 units)   Frontalis muscle bilaterally (a total of 20 units divided into 4 sites)   Temporalis muscle bilaterally (a total of 40 units divided into 8 sites)   Occipitalis muscle bilaterally (a total of 30 units divided into 6 sites)   Cervical paraspinal muscles (a total of 20 units divided into 4 sites)   Trapezius muscle bilaterally (a total of 30 units divided into 6 sites)   Complications: none   RTC for the next Botox injection: 12 weeks     CC: MD Erika Cohen FNP-C  [facility] Department of Neurology   [phone]

## 2019-05-15 ENCOUNTER — PROCEDURE VISIT (OUTPATIENT)
Dept: NEUROLOGY | Facility: CLINIC | Age: 49
End: 2019-05-15
Payer: OTHER GOVERNMENT

## 2019-05-15 VITALS
WEIGHT: 153 LBS | HEART RATE: 68 BPM | HEIGHT: 60 IN | DIASTOLIC BLOOD PRESSURE: 68 MMHG | SYSTOLIC BLOOD PRESSURE: 103 MMHG | BODY MASS INDEX: 30.04 KG/M2

## 2019-05-15 DIAGNOSIS — F32.A DEPRESSION, UNSPECIFIED DEPRESSION TYPE: ICD-10-CM

## 2019-05-15 DIAGNOSIS — M79.7 FIBROMYALGIA: ICD-10-CM

## 2019-05-15 DIAGNOSIS — G47.9 TROUBLE IN SLEEPING: ICD-10-CM

## 2019-05-15 DIAGNOSIS — G43.019 INTRACTABLE MIGRAINE WITHOUT AURA AND WITHOUT STATUS MIGRAINOSUS: ICD-10-CM

## 2019-05-15 PROCEDURE — 64615 CHEMODENERV MUSC MIGRAINE: CPT | Mod: S$PBB,,, | Performed by: NURSE PRACTITIONER

## 2019-05-15 PROCEDURE — 64615 CHEMODENERV MUSC MIGRAINE: CPT | Mod: PBBFAC | Performed by: NURSE PRACTITIONER

## 2019-05-15 PROCEDURE — 64615 PR CHEMODENERVATION OF MUSCLE FOR CHRONIC MIGRAINE: ICD-10-PCS | Mod: S$PBB,,, | Performed by: NURSE PRACTITIONER

## 2019-05-15 RX ORDER — SUMATRIPTAN 50 MG/1
TABLET, FILM COATED ORAL
Qty: 12 TABLET | Refills: 5 | Status: SHIPPED | OUTPATIENT
Start: 2019-05-15 | End: 2019-10-29 | Stop reason: SDUPTHER

## 2019-05-15 RX ORDER — TIZANIDINE 4 MG/1
4 TABLET ORAL DAILY
COMMUNITY
Start: 2019-05-06 | End: 2019-10-29 | Stop reason: SDUPTHER

## 2019-05-15 RX ADMIN — ONABOTULINUMTOXINA 200 UNITS: 100 INJECTION, POWDER, LYOPHILIZED, FOR SOLUTION INTRADERMAL; INTRAMUSCULAR at 01:05

## 2019-05-15 NOTE — PROCEDURES
SUBJECTIVE:  Patient ID: Massiel Alatorre  Chief Complaint: Botulinum Toxin Injection    History of Present Illness:  Massiel Alatorre is a 48 y.o. female who presents to clinic with her  for follow-up of headaches and Botox injections.     Recommendations made at last Office Visit on 2/22/2019:  - Botox administered in clinic for Chronic Migraine (see below)   - Continue Aimovig 140 mg SQ monthly injections   - Continue Lyrica, depakote, wellbutrin, and amitriptyline daily   - For migraine abortive - given sumatriptan 50 mg tabs and sumatriptan 6 mg SQ injectable   - Fibromyalgia - management per Rheumatology, continue regular f/up   - Depression - currently on wellbutrin daily, management per primary care provider   - Trouble in sleep - discussed amitriptyline can help with sleep   - RTC in 12 weeks for repeat Botox injections or sooner if needed     05/15/2019- Interval History:  She continues to experience 7 headache free days after Botox injections.  She has continued using Aimovig 140 mg SQ monthly injections, denies presence of side effects as well as Depakote ER.  She continues to feel Botox is helping her migraines as they are more manageable when she does get one and daily headaches are less intense.  She is under the care of a rheumatologist who has prescribed lyrica and amitriptyline.  She has continued complaints of trouble sleeping despite being started on Tizanidine 4 mg nightly by her Neurosurgeon.  Migraines continue to feel the same as they always have, she denies any change in the quality or nature of her migraines.     02/22/2019- Interval History:  She has had 7 full days over the last twelve weeks headache free.  She does feel her headaches have been getting better with dual Aimovig and Botox therapy and wishes to continue with Botox injections.  She has continued taking depakote, wellbutrin and amitriptyline daily.  Has been using either sumatriptan PO or injectable for migraine abortive.   Migraines continue to feel the same as they always have, she denies any change in the quality or nature of her migraines.      11/29/2018- Interval History:  She has started Aimovig 140 mg SQ injections, she has not noticed any change in her migraines since starting Aimovig.  She was seen by her Rheumatologist who switched doxepin to Amitriptyline, she is waiting for prescription from her pharmacy prior to beginning injections.  She has continued going to physical therapy regularly, is still looking for a Neurosurgeon to see for her neck pain closer to her home as her insurance would not approve her seeing Neurosurgery at [facility].  She does wish to continue with Botox injections for chronic migraine. Additionally, she is requesting alternative abortive therapy as she has been having a hard time getting sumatriptan 100 mg tabs from her pharmacy as well as sumatriptan 4 mg SQ injections, was told by her pharmacy that both medications were on back order.  She denies any change in the quality or nature of her migraines.      10/18/2018 - Interval History:  Headaches have persisted, she continues to feel the Botox is giving her some relief as the intensity of her migraines is decreased.  She feels her body has gotten used to the Botox injections, she also feels her body is getting used to doxepin as she is no longer sleeping well, she has an appointment with her rheumatologist on Monday who is prescribing the Doxepin, is interested in switching doxepin back to Amitriptyline, as she took Amitriptyline in the past and did find it was useful with regards to both sleep and migraine prevention.  She has been going to physical therapy regularly, is requesting a referral to neurosurgery for her neck to see what her options may be.       09/07/2018- Interval History:  Patient 30 minutes late for her appointment, discussed would administered Botox today as she drove from [place], however will need to see her back in 6 weeks  for follow-up, which she is agreeable to.       06/29/2018- Interval History:  She continues to experience 5 days where she is headache free, prior to Botox, she was not having any headache free days.  Currently complaining of a headache rated 20 out of 10, though she is conversant, pleasant and laughing during clinic visit, well.  She has received GammaCore device, which she does feel has decreased the intensity of her migraines, though it does not fully abort her migraines.  She has continued all medications as directed.  Has complaints of short term memory disturbances, which is very concerning to her, is considering filing for disability for memory disturbance.  Also complains of chronic pain and fibromyalgia for which she is under the care of Dr. Gomez and Dr. Urbano at [facility] in [place].  MRI C-Spine never done, will have imaging scheduled today. She does not wish to make adjustments to her treatment plan at this time, would like to do one more round of Botox to see if it gives her greater response, if not, will be willing to make adjustments at that time.  Headaches continue to feel the same as they always have, she denies any change in the quality or nature of her headaches.      04/20/2018- Interval History:  Headaches continue to occur nearly everyday, states for the first 6-8 weeks after Botox injections she does notice a decrease in the intensity of her headaches.  She is very happy to report she actually had two headache free days in a row since her last Botox injections.  For the first 4-6 weeks after last Botox injections she was actually having one headache free day per week, which she is happy about.  She can definitely tell when she is due for her next Botox injections, the intensity, frequency and duration of her headaches are significantly worse.  She continues to feel a portion of her headaches and migraines are coming from her neck and she requests to have imaging done of her  neck. She does wish to continue with Botox injections for chronic migraine.       Interval History:  In the interim, headaches she has not noticed any difference in her headaches.  Nerve blocks helped for 1-2 days, but then returned at previous intensity and frequency.  Sumatriptan injectable 4 mg did cause the flushing, however not as intense as 6 mg dose, and was effective with aborting her migraine, she is requesting to have sumatriptan pill available as well because she would prefer to not have to inject herself unless she really  Has to.  Previously had two rounds of Botox, last in August 2017, which were effective in preventing her migraines, she was noticing her headaches were easing off, she was not having as many aura events.  She denies any presence from Botox injections in the past and would like to restart the injections.       Initial ABREU HPI:  47 y.o. female with chronic migraines, anxiety, depression, vitamin D deficiency, and trouble sleeping, who presents to clinic with her significant other for evaluation of headaches.  Headaches previously managed by Neurologist in [place], but  forced her to establish care at alternative facility.    Headaches initially began in her early teenage years, states she has tried numerous medications none of which helped her.  Most recently she started Botox injections, states she had two rounds, after which she was only experiencing 7-8 headaches per month, last round done in August.  Since the middle of October, she has been experiencing a constant migraine all day, everyday.  Pain is described as a piercing or throbbing pain located on the right side of her head.  Onset of migraine very abrupt, over about 20 minutes.  Associated symptoms include nausea, photophobia, phonophobia.  She is currently taking Depakote 1000 mg nightly, Wellbutrin, Lyrica, and doxepin daily for prevention.  In the past she has used sumatriptan injectable for abortive, which she  thinks worked, however caused facial flushing.  She would like to restart Botox injections for chronic migraine.       Treatments Tried and Response  Sumatriptan injectable - side effects, but it was effective    Imitrex - some relief   Maxalt - she did get some relief   Elavil - lost effectiveness   Doxepin -   Depakote   Cymbalta -   Topiramate - caused memory problems   Percocet -   Ultram -   Excedrin -   Lyrica -   Botox - helped   Wellbutrin - ?  Prednisone - helps, but causes her to break out in acne  Aimovig - helping     Current Medications:    amitriptyline (ELAVIL) 25 MG tablet, Take 25 mg by mouth nightly as needed for Insomnia., Disp: , Rfl:     ASPIRIN/ACETAMINOPHEN/CAFFEINE (EXCEDRIN MIGRAINE ORAL), Take by mouth 2 (two) times daily as needed., Disp: , Rfl:     buPROPion (WELLBUTRIN XL) 150 MG TB24 tablet, Take 150 mg by mouth once daily., Disp: , Rfl:     cholecalciferol, vitamin D3, 5,000 unit Tab, Take 5,000 Units by mouth once daily., Disp: , Rfl:     diazePAM (VALIUM) 5 MG tablet, Take 5 mg by mouth 2 (two) times daily as needed., Disp: , Rfl:     divalproex ER (DEPAKOTE) 500 MG Tb24, Take 3 tablets (1,500 mg total) by mouth every evening., Disp: 270 tablet, Rfl: 3    erenumab-aooe (AIMOVIG AUTOINJECTOR) 70 mg/mL AtIn, Inject 2 mLs (140 mg total) into the skin every 28 days., Disp: 2 mL, Rfl: 11    ergocalciferol (VITAMIN D2) 50,000 unit Cap, Take 150,000 Units by mouth every 7 days., Disp: , Rfl:     hydrOXYzine pamoate (VISTARIL) 50 MG Cap, Take 1 capsule 60 minutes prior to imaging, may repeat dose in 1 hour if needed.  Must secure ride to and from MRI., Disp: 2 capsule, Rfl: 0    pregabalin (LYRICA) 150 MG capsule, Take 150 mg by mouth 2 (two) times daily. , Disp: , Rfl:     sumatriptan (IMITREX STATDOSE) 6 mg/0.5 mL kit, Inject into skin at onset of migraine, may repeat dose in 1 hour if needed. Max 2 injections/day. Max 3 days/week., Disp: 12 mL, Rfl: 5    sumatriptan (IMITREX)  50 MG tablet, 1 tab at onset of migraine, may repeat in 2 hrs if needed. No more than 2 tabs/day or 3 days/week., Disp: 12 tablet, Rfl: 5    tiZANidine (ZANAFLEX) 4 MG tablet, Take 4 mg by mouth once daily., Disp: , Rfl:     traMADol (ULTRAM) 50 mg tablet, Take 50 mg by mouth 2 (two) times daily as needed., Disp: , Rfl:     Current Facility-Administered Medications:     onabotulinumtoxina injection 200 Units, 200 Units, Intramuscular, Q90 Days, Erika Vulevich, FNP, 200 Units at 09/07/18 1152    onabotulinumtoxina injection 200 Units, 200 Units, Intramuscular, Q90 Days, Erika Vulevich, FNP    onabotulinumtoxina injection 200 Units, 200 Units, Intramuscular, Q90 Days, Erika Vulevich, FNP, 200 Units at 11/29/18 1127    onabotulinumtoxina injection 200 Units, 200 Units, Intramuscular, Q90 Days, Erika Vulevich, FNP, 200 Units at 02/22/19 1124    onabotulinumtoxina injection 200 Units, 200 Units, Intramuscular, Q90 Days, Erika Vulevich, FNP    Review of Systems - as per HPI, otherwise a balanced 10 systems review is negative.    OBJECTIVE:  Vitals:  /68 (BP Location: Right arm, Patient Position: Sitting, BP Method: Large (Automatic))   Pulse 68   Ht 5' (1.524 m)   Wt 69.4 kg (153 lb)   BMI 29.88 kg/m²     Physical Exam:  Constitutional: she appears well-developed and well-nourished. she is well groomed. NAD   HENT:    Head: Normocephalic and atraumatic  Eyes: Conjunctivae and EOM are normal  Skin: Skin is warm and dry.  Psychiatric: Mood and affect are normal    Neuro: Patient is alert and oriented to person, place, and time. Language is intact and fluent.  Recent and remote memory are intact.  Normal attention and concentration.  Facial movement is symmetric.  Gait is normal.     ASSESSMENT:  1. Chronic migraine    2. Intractable migraine without aura and without status migrainosus    3. Trouble in sleeping    4. Depression, unspecified depression type    5. Fibromyalgia      PLAN:  -  Botox administered in clinic for Chronic Migraine (see below)   - Continue Aimovig 140 mg SQ monthly injections   - Continue Lyrica, depakote, wellbutrin, and amitriptyline daily   - For migraine abortive - has sumatriptan 50 mg tabs and sumatriptan 6 mg SQ injectable available  - Fibromyalgia - management per Rheumatology, continue regular f/up   - Depression - currently on wellbutrin daily, management per primary care provider   - Trouble in sleep - recently started on Tizanidine 4 mg nightly, encouraged her to discuss increasing dose with Neurosurgeon.  Would consider slight increase in Amitriptyline in the future if needed.   - RTC in 12 weeks for repeat Botox injections or sooner if needed     Orders Placed This Encounter    sumatriptan (IMITREX) 50 MG tablet     All questions and concerns addressed.  Patient verbalizes understanding and is agreeable with the above stated treatment plan.      PROCEDURE NOTE:  BOTOX was performed as an indicated therapy for intractable chronic migraine headaches given that the patient failed more than 2 headache medications    Two patient identifiers were confirmed with the patient prior to performing this procedure. A time out to determine correct patient and and agreement on procedure performed was conducted prior to the procedure.      Botulinum Toxin Injection Procedure   Procedure: Chemical neurolysis   After risks and benefits were explained including bleeding, infection, worsening of pain, damage to the areas being injected, weakness of muscles, loss of muscle control, dysphagia if injecting the head or neck, facial droop if injecting the facial area, painful injection, allergic or other reaction to the medications being injected, and the failure of the procedure to help the problem, a signed consent was obtained.   The patient was placed in a comfortable area and the sites to be treated were identified.The area to be treated was prepped three times with alcohol and the  alcohol allowed to dry. Next, a 30 gauge needle was used to inject the medication in the area to be treated.      Total Botox used: 155 Units   Botox wastage: 45 Units     Injection sites:    muscle bilaterally ( a total of 10 units divided into 2 sites)   Procerus muscle (5 units)   Frontalis muscle bilaterally (a total of 20 units divided into 4 sites)   Temporalis muscle bilaterally (a total of 40 units divided into 8 sites)   Occipitalis muscle bilaterally (a total of 30 units divided into 6 sites)   Cervical paraspinal muscles (a total of 20 units divided into 4 sites)   Trapezius muscle bilaterally (a total of 30 units divided into 6 sites)   Complications: none   RTC for the next Botox injection: 12 weeks     CC: MD Erika Cohen FNP-C [facility] Department of Neurology   [phone]

## 2019-08-09 ENCOUNTER — PROCEDURE VISIT (OUTPATIENT)
Dept: NEUROLOGY | Facility: CLINIC | Age: 49
End: 2019-08-09
Payer: OTHER GOVERNMENT

## 2019-08-09 VITALS
WEIGHT: 155.19 LBS | HEIGHT: 60 IN | SYSTOLIC BLOOD PRESSURE: 114 MMHG | BODY MASS INDEX: 30.47 KG/M2 | DIASTOLIC BLOOD PRESSURE: 78 MMHG | HEART RATE: 96 BPM

## 2019-08-09 DIAGNOSIS — G43.019 INTRACTABLE MIGRAINE WITHOUT AURA AND WITHOUT STATUS MIGRAINOSUS: ICD-10-CM

## 2019-08-09 DIAGNOSIS — G47.9 TROUBLE IN SLEEPING: ICD-10-CM

## 2019-08-09 DIAGNOSIS — F32.A DEPRESSION, UNSPECIFIED DEPRESSION TYPE: ICD-10-CM

## 2019-08-09 DIAGNOSIS — M79.7 FIBROMYALGIA: ICD-10-CM

## 2019-08-09 DIAGNOSIS — M48.02 CERVICAL STENOSIS OF SPINE: ICD-10-CM

## 2019-08-09 PROCEDURE — 64615 CHEMODENERV MUSC MIGRAINE: CPT | Mod: S$PBB,,, | Performed by: NURSE PRACTITIONER

## 2019-08-09 PROCEDURE — 64615 PR CHEMODENERVATION OF MUSCLE FOR CHRONIC MIGRAINE: ICD-10-PCS | Mod: S$PBB,,, | Performed by: NURSE PRACTITIONER

## 2019-08-09 PROCEDURE — 64615 CHEMODENERV MUSC MIGRAINE: CPT | Mod: PBBFAC | Performed by: NURSE PRACTITIONER

## 2019-08-09 RX ADMIN — ONABOTULINUMTOXINA 200 UNITS: 100 INJECTION, POWDER, LYOPHILIZED, FOR SOLUTION INTRADERMAL; INTRAMUSCULAR at 11:08

## 2019-08-09 NOTE — PROCEDURES
SUBJECTIVE:  Patient ID: Massiel Alatorre  Chief Complaint: Follow-up and Botulinum Toxin Injection    History of Present Illness:  Massiel Alatorre is a 49 y.o. female who presents to clinic alone for follow-up of headaches and Botox injections.     Recommendations made at last Office Visit on 5/15/2019:  - Botox administered in clinic for Chronic Migraine (see below)   - Continue Aimovig 140 mg SQ monthly injections   - Continue Lyrica, depakote, wellbutrin, and amitriptyline daily   - For migraine abortive - has sumatriptan 50 mg tabs and sumatriptan 6 mg SQ injectable available  - Fibromyalgia - management per Rheumatology, continue regular f/up   - Depression - currently on wellbutrin daily, management per primary care provider   - Trouble in sleep - recently started on Tizanidine 4 mg nightly, encouraged her to discuss increasing dose with Neurosurgeon.  Would consider slight increase in Amitriptyline in the future if needed.   - RTC in 12 weeks for repeat Botox injections or sooner if needed     08/09/2019- Interval History:  She continues to experience 7-9 headache free days following Botox injections.  She was seen by Neurologist at a pain and spine clinic in [place], had a C1/C2 nerve block performed on July 30th, no migraine for 2 days following, is having a diagnostic nerve block with ultrasound guidance on August 13th, if improvement is clinically significant, plan is to move forward with what sounds to be RFA.  Migraines and headaches continue to feel the same as they always have, she admits her mood has been vastly improved since nerve blocks as finally feels there is a light at the end of the tunnel.  She is ready to get her life back!  No medication changes since her last visit.  Does want to continue with this round of Botox as per recommendation from Dr. De Leon.      05/15/2019- Interval History:  She continues to experience 7 headache free days after Botox injections.  She has continued using Aimovig  140 mg SQ monthly injections, denies presence of side effects as well as Depakote ER.  She continues to feel Botox is helping her migraines as they are more manageable when she does get one and daily headaches are less intense.  She is under the care of a rheumatologist who has prescribed lyrica and amitriptyline.  She has continued complaints of trouble sleeping despite being started on Tizanidine 4 mg nightly by her Neurosurgeon.  Migraines continue to feel the same as they always have, she denies any change in the quality or nature of her migraines.      02/22/2019- Interval History:  She has had 7 full days over the last twelve weeks headache free.  She does feel her headaches have been getting better with dual Aimovig and Botox therapy and wishes to continue with Botox injections.  She has continued taking depakote, wellbutrin and amitriptyline daily.  Has been using either sumatriptan PO or injectable for migraine abortive.  Migraines continue to feel the same as they always have, she denies any change in the quality or nature of her migraines.      11/29/2018- Interval History:  She has started Aimovig 140 mg SQ injections, she has not noticed any change in her migraines since starting Aimovig.  She was seen by her Rheumatologist who switched doxepin to Amitriptyline, she is waiting for prescription from her pharmacy prior to beginning injections.  She has continued going to physical therapy regularly, is still looking for a Neurosurgeon to see for her neck pain closer to her home as her insurance would not approve her seeing Neurosurgery at [facility].  She does wish to continue with Botox injections for chronic migraine. Additionally, she is requesting alternative abortive therapy as she has been having a hard time getting sumatriptan 100 mg tabs from her pharmacy as well as sumatriptan 4 mg SQ injections, was told by her pharmacy that both medications were on back order.  She denies any change in the quality  or nature of her migraines.      10/18/2018 - Interval History:  Headaches have persisted, she continues to feel the Botox is giving her some relief as the intensity of her migraines is decreased.  She feels her body has gotten used to the Botox injections, she also feels her body is getting used to doxepin as she is no longer sleeping well, she has an appointment with her rheumatologist on Monday who is prescribing the Doxepin, is interested in switching doxepin back to Amitriptyline, as she took Amitriptyline in the past and did find it was useful with regards to both sleep and migraine prevention.  She has been going to physical therapy regularly, is requesting a referral to neurosurgery for her neck to see what her options may be.       09/07/2018- Interval History:  Patient 30 minutes late for her appointment, discussed would administered Botox today as she drove from [place], however will need to see her back in 6 weeks for follow-up, which she is agreeable to.       06/29/2018- Interval History:  She continues to experience 5 days where she is headache free, prior to Botox, she was not having any headache free days.  Currently complaining of a headache rated 20 out of 10, though she is conversant, pleasant and laughing during clinic visit, well.  She has received GammaCore device, which she does feel has decreased the intensity of her migraines, though it does not fully abort her migraines.  She has continued all medications as directed.  Has complaints of short term memory disturbances, which is very concerning to her, is considering filing for disability for memory disturbance.  Also complains of chronic pain and fibromyalgia for which she is under the care of Dr. Gomez and Dr. Urbano at [facility] in [place].  MRI C-Spine never done, will have imaging scheduled today. She does not wish to make adjustments to her treatment plan at this time, would like to do one more round of Botox to see  if it gives her greater response, if not, will be willing to make adjustments at that time.  Headaches continue to feel the same as they always have, she denies any change in the quality or nature of her headaches.      04/20/2018- Interval History:  Headaches continue to occur nearly everyday, states for the first 6-8 weeks after Botox injections she does notice a decrease in the intensity of her headaches.  She is very happy to report she actually had two headache free days in a row since her last Botox injections.  For the first 4-6 weeks after last Botox injections she was actually having one headache free day per week, which she is happy about.  She can definitely tell when she is due for her next Botox injections, the intensity, frequency and duration of her headaches are significantly worse.  She continues to feel a portion of her headaches and migraines are coming from her neck and she requests to have imaging done of her neck. She does wish to continue with Botox injections for chronic migraine.       Interval History:  In the interim, headaches she has not noticed any difference in her headaches.  Nerve blocks helped for 1-2 days, but then returned at previous intensity and frequency.  Sumatriptan injectable 4 mg did cause the flushing, however not as intense as 6 mg dose, and was effective with aborting her migraine, she is requesting to have sumatriptan pill available as well because she would prefer to not have to inject herself unless she really  Has to.  Previously had two rounds of Botox, last in August 2017, which were effective in preventing her migraines, she was noticing her headaches were easing off, she was not having as many aura events.  She denies any presence from Botox injections in the past and would like to restart the injections.       Initial ABREU HPI:  47 y.o. female with chronic migraines, anxiety, depression, vitamin D deficiency, and trouble sleeping, who presents to clinic with her  significant other for evaluation of headaches.  Headaches previously managed by Neurologist in [place], but  forced her to establish care at alternative facility.    Headaches initially began in her early teenage years, states she has tried numerous medications none of which helped her.  Most recently she started Botox injections, states she had two rounds, after which she was only experiencing 7-8 headaches per month, last round done in August.  Since the middle of October, she has been experiencing a constant migraine all day, everyday.  Pain is described as a piercing or throbbing pain located on the right side of her head.  Onset of migraine very abrupt, over about 20 minutes.  Associated symptoms include nausea, photophobia, phonophobia.  She is currently taking Depakote 1000 mg nightly, Wellbutrin, Lyrica, and doxepin daily for prevention.  In the past she has used sumatriptan injectable for abortive, which she thinks worked, however caused facial flushing.  She would like to restart Botox injections for chronic migraine.       Treatments Tried and Response  Sumatriptan injectable - side effects, but it was effective    Imitrex - some relief   Maxalt - she did get some relief   Elavil - lost effectiveness   Doxepin -   Depakote   Cymbalta -   Topiramate - caused memory problems   Percocet -   Ultram -   Excedrin -   Lyrica -   Botox - helped   Wellbutrin - ?  Prednisone - helps, but causes her to break out in acne  Aimovig - helping     Current Medications:    amitriptyline (ELAVIL) 25 MG tablet, Take 25 mg by mouth nightly as needed for Insomnia., Disp: , Rfl:     ASPIRIN/ACETAMINOPHEN/CAFFEINE (EXCEDRIN MIGRAINE ORAL), Take by mouth 2 (two) times daily as needed., Disp: , Rfl:     buPROPion (WELLBUTRIN XL) 150 MG TB24 tablet, Take 150 mg by mouth once daily., Disp: , Rfl:     cholecalciferol, vitamin D3, 5,000 unit Tab, Take 5,000 Units by mouth once daily., Disp: , Rfl:     diazePAM (VALIUM)  5 MG tablet, Take 5 mg by mouth 2 (two) times daily as needed., Disp: , Rfl:     divalproex ER (DEPAKOTE) 500 MG Tb24, Take 3 tablets (1,500 mg total) by mouth every evening., Disp: 270 tablet, Rfl: 3    erenumab-aooe (AIMOVIG AUTOINJECTOR) 70 mg/mL AtIn, Inject 2 mLs (140 mg total) into the skin every 28 days., Disp: 2 mL, Rfl: 11    ergocalciferol (VITAMIN D2) 50,000 unit Cap, Take 150,000 Units by mouth every 7 days., Disp: , Rfl:     pregabalin (LYRICA) 150 MG capsule, Take 150 mg by mouth 2 (two) times daily. , Disp: , Rfl:     sumatriptan (IMITREX STATDOSE) 6 mg/0.5 mL kit, Inject into skin at onset of migraine, may repeat dose in 1 hour if needed. Max 2 injections/day. Max 3 days/week., Disp: 12 mL, Rfl: 5    sumatriptan (IMITREX) 50 MG tablet, 1 tab at onset of migraine, may repeat in 2 hrs if needed. No more than 2 tabs/day or 3 days/week., Disp: 12 tablet, Rfl: 5    tiZANidine (ZANAFLEX) 4 MG tablet, Take 4 mg by mouth once daily., Disp: , Rfl:     traMADol (ULTRAM) 50 mg tablet, Take 50 mg by mouth 2 (two) times daily as needed., Disp: , Rfl:     Current Facility-Administered Medications:     onabotulinumtoxina injection 200 Units, 200 Units, Intramuscular, Q90 Days, Erikajamin Watkins, FNP, 200 Units at 09/07/18 1152    onabotulinumtoxina injection 200 Units, 200 Units, Intramuscular, Q90 Days, Erika Vulevich, FNP    onabotulinumtoxina injection 200 Units, 200 Units, Intramuscular, Q90 Days, Erika Vulevich, FNP, 200 Units at 11/29/18 1127    onabotulinumtoxina injection 200 Units, 200 Units, Intramuscular, Q90 Days, Erikajean Watkins, FNP, 200 Units at 02/22/19 1124    onabotulinumtoxina injection 200 Units, 200 Units, Intramuscular, Q90 Days, RONDA IrizarryP, 200 Units at 05/15/19 1321    onabotulinumtoxina injection 200 Units, 200 Units, Intramuscular, Q90 Days, SHMUEL Irizarry, 200 Units at 08/09/19 1149    Review of Systems - as per HPI, otherwise a balanced 10  systems review is negative.    OBJECTIVE:  Vitals:  /78 (BP Location: Left arm, Patient Position: Sitting, BP Method: Large (Automatic))   Pulse 96   Ht 5' (1.524 m)   Wt 70.4 kg (155 lb 3.3 oz)   BMI 30.31 kg/m²     Physical Exam:  Constitutional: she appears well-developed and well-nourished. she is well groomed. NAD   HENT:    Head: Normocephalic and atraumatic  Eyes: Conjunctivae and EOM are normal  Skin: Skin is warm and dry.  Psychiatric: Mood and affect are normal    Neuro: Patient is alert and oriented to person, place, and time. Language is intact and fluent.  Recent and remote memory are intact.  Normal attention and concentration.  Facial movement is symmetric.  Gait is normal.     Review of Data:   Notes from Dr. Galicia, Shahid Wu PA-C, Danelle Santana NP reviewed via care everywhere.      ASSESSMENT:  1. Chronic migraine    2. Intractable migraine without aura and without status migrainosus    3. Cervical stenosis of spine    4. Fibromyalgia    5. Trouble in sleeping    6. Depression, unspecified depression type      PLAN:  - Botox administered in clinic for Chronic Migraine (see below)   - Continue Aimovig 140 mg SQ monthly injections   - Continue Lyrica, depakote, wellbutrin, and amitriptyline daily   - For migraine abortive - has sumatriptan 50 mg tabs and sumatriptan 6 mg SQ injectable available  - Cervical Stenosis - Scheduled for diagnostic nerve block under u/s guidance on 8/13, if improvement is clinically significant, plan to move forward with RFA   - Fibromyalgia - management per Rheumatology, continue regular f/up   - Depression - currently on wellbutrin daily, management per primary care provider   - Trouble in sleep - recently started on Tizanidine 4 mg nightly, encouraged her to discuss increasing dose with Neurosurgeon.  Would consider slight increase in Amitriptyline in the future if needed.   - RTC in 12 weeks for repeat Botox injections or sooner if needed          All  questions and concerns addressed.  Patient verbalizes understanding and is agreeable with the above stated treatment plan.      PROCEDURE NOTE:  BOTOX was performed as an indicated therapy for intractable chronic migraine headaches given that the patient failed more than 2 headache medications    Two patient identifiers were confirmed with the patient prior to performing this procedure. A time out to determine correct patient and and agreement on procedure performed was conducted prior to the procedure.      Botulinum Toxin Injection Procedure   Procedure: Chemical neurolysis   After risks and benefits were explained including bleeding, infection, worsening of pain, damage to the areas being injected, weakness of muscles, loss of muscle control, dysphagia if injecting the head or neck, facial droop if injecting the facial area, painful injection, allergic or other reaction to the medications being injected, and the failure of the procedure to help the problem, a signed consent was obtained.   The patient was placed in a comfortable area and the sites to be treated were identified.The area to be treated was prepped three times with alcohol and the alcohol allowed to dry. Next, a 30 gauge needle was used to inject the medication in the area to be treated.      Total Botox used: 155 Units   Botox wastage: 45 Units     Injection sites:    muscle bilaterally ( a total of 10 units divided into 2 sites)   Procerus muscle (5 units)   Frontalis muscle bilaterally (a total of 20 units divided into 4 sites)   Temporalis muscle bilaterally (a total of 40 units divided into 8 sites)   Occipitalis muscle bilaterally (a total of 30 units divided into 6 sites)   Cervical paraspinal muscles (a total of 20 units divided into 4 sites)   Trapezius muscle bilaterally (a total of 30 units divided into 6 sites)   Complications: none   RTC for the next Botox injection: 12 weeks     CC: MD Erika Cohen  GILBERT Watkins  [facility]   [phone]

## 2019-10-29 ENCOUNTER — PROCEDURE VISIT (OUTPATIENT)
Dept: NEUROLOGY | Facility: CLINIC | Age: 49
End: 2019-10-29
Payer: OTHER GOVERNMENT

## 2019-10-29 VITALS
SYSTOLIC BLOOD PRESSURE: 118 MMHG | DIASTOLIC BLOOD PRESSURE: 79 MMHG | WEIGHT: 151.69 LBS | BODY MASS INDEX: 29.78 KG/M2 | HEIGHT: 60 IN | HEART RATE: 84 BPM

## 2019-10-29 DIAGNOSIS — G43.019 INTRACTABLE MIGRAINE WITHOUT AURA AND WITHOUT STATUS MIGRAINOSUS: ICD-10-CM

## 2019-10-29 DIAGNOSIS — M48.02 CERVICAL STENOSIS OF SPINE: ICD-10-CM

## 2019-10-29 DIAGNOSIS — G47.9 TROUBLE IN SLEEPING: ICD-10-CM

## 2019-10-29 DIAGNOSIS — M79.7 FIBROMYALGIA: ICD-10-CM

## 2019-10-29 PROCEDURE — 64615 CHEMODENERV MUSC MIGRAINE: CPT | Mod: PBBFAC | Performed by: NURSE PRACTITIONER

## 2019-10-29 PROCEDURE — 64615 PR CHEMODENERVATION OF MUSCLE FOR CHRONIC MIGRAINE: ICD-10-PCS | Mod: S$PBB,,, | Performed by: NURSE PRACTITIONER

## 2019-10-29 PROCEDURE — 64615 CHEMODENERV MUSC MIGRAINE: CPT | Mod: S$PBB,,, | Performed by: NURSE PRACTITIONER

## 2019-10-29 RX ORDER — SUMATRIPTAN 50 MG/1
TABLET, FILM COATED ORAL
Qty: 12 TABLET | Refills: 5 | Status: SHIPPED | OUTPATIENT
Start: 2019-10-29

## 2019-10-29 RX ORDER — DIVALPROEX SODIUM 500 MG/1
1500 TABLET, FILM COATED, EXTENDED RELEASE ORAL NIGHTLY
Qty: 270 TABLET | Refills: 3 | Status: SHIPPED | OUTPATIENT
Start: 2019-10-29

## 2019-10-29 RX ORDER — AMITRIPTYLINE HYDROCHLORIDE 50 MG/1
50 TABLET, FILM COATED ORAL NIGHTLY
Qty: 30 TABLET | Refills: 11 | Status: SHIPPED | OUTPATIENT
Start: 2019-10-29

## 2019-10-29 RX ORDER — CEFUROXIME AXETIL 250 MG/1
TABLET ORAL
Qty: 12 ML | Refills: 5 | Status: SHIPPED | OUTPATIENT
Start: 2019-10-29

## 2019-10-29 RX ORDER — TIZANIDINE 4 MG/1
4 TABLET ORAL DAILY
Qty: 30 TABLET | Refills: 11 | Status: SHIPPED | OUTPATIENT
Start: 2019-10-29

## 2019-10-29 RX ADMIN — ONABOTULINUMTOXINA 200 UNITS: 100 INJECTION, POWDER, LYOPHILIZED, FOR SOLUTION INTRADERMAL; INTRAMUSCULAR at 10:10

## 2019-10-29 NOTE — PROCEDURES
SUBJECTIVE:  Patient ID: Massiel Alatorre  Chief Complaint: Follow-up and Botulinum Toxin Injection    History of Present Illness:  Massiel Alatorre is a 49 y.o. female who presents to clinic with her  for follow-up of headaches and Botox injections.     Recommendations made at last Office Visit on 8/9/2019:  - Botox administered in clinic for Chronic Migraine (see below)   - Continue Aimovig 140 mg SQ monthly injections   - Continue Lyrica, depakote, wellbutrin, and amitriptyline daily   - For migraine abortive - has sumatriptan 50 mg tabs and sumatriptan 6 mg SQ injectable available  - Cervical Stenosis - Scheduled for diagnostic nerve block under u/s guidance on 8/13, if improvement is clinically significant, plan to move forward with RFA   - Fibromyalgia - management per Rheumatology, continue regular f/up   - Depression - currently on wellbutrin daily, management per primary care provider   - Trouble in sleep - recently started on Tizanidine 4 mg nightly, encouraged her to discuss increasing dose with Neurosurgeon.  Would consider slight increase in Amitriptyline in the future if needed.   - RTC in 12 weeks for repeat Botox injections or sooner if needed     10/29/2019- Interval History:  She continues to experienced up to 10 headache/migraine free days following Botox injections, admits over the last 12 weeks, she was attacked by a pitbull and has been dealing with the stress of this.  Has had significant pain in her left arm, was told she may need reconstructive surgery.  She is happy with the current state of her headaches and does not feel adjustments need to be made at this time.      Otherwise, information below is still accurate and current.     08/09/2019- Interval History:  She continues to experience 7-9 headache free days following Botox injections.  She was seen by Neurologist at a pain and spine clinic in [place], had a C1/C2 nerve block performed on July 30th, no migraine for 2 days following, is  having a diagnostic nerve block with ultrasound guidance on August 13th, if improvement is clinically significant, plan is to move forward with what sounds to be RFA.  Migraines and headaches continue to feel the same as they always have, she admits her mood has been vastly improved since nerve blocks as finally feels there is a light at the end of the tunnel.  She is ready to get her life back!  No medication changes since her last visit.  Does want to continue with this round of Botox as per recommendation from Dr. De Leon.       05/15/2019- Interval History:  She continues to experience 7 headache free days after Botox injections.  She has continued using Aimovig 140 mg SQ monthly injections, denies presence of side effects as well as Depakote ER.  She continues to feel Botox is helping her migraines as they are more manageable when she does get one and daily headaches are less intense.  She is under the care of a rheumatologist who has prescribed lyrica and amitriptyline.  She has continued complaints of trouble sleeping despite being started on Tizanidine 4 mg nightly by her Neurosurgeon.  Migraines continue to feel the same as they always have, she denies any change in the quality or nature of her migraines.      02/22/2019- Interval History:  She has had 7 full days over the last twelve weeks headache free.  She does feel her headaches have been getting better with dual Aimovig and Botox therapy and wishes to continue with Botox injections.  She has continued taking depakote, wellbutrin and amitriptyline daily.  Has been using either sumatriptan PO or injectable for migraine abortive.  Migraines continue to feel the same as they always have, she denies any change in the quality or nature of her migraines.      11/29/2018- Interval History:  She has started Aimovig 140 mg SQ injections, she has not noticed any change in her migraines since starting Aimovig.  She was seen by her Rheumatologist who switched  doxepin to Amitriptyline, she is waiting for prescription from her pharmacy prior to beginning injections.  She has continued going to physical therapy regularly, is still looking for a Neurosurgeon to see for her neck pain closer to her home as her insurance would not approve her seeing Neurosurgery at [facility].  She does wish to continue with Botox injections for chronic migraine. Additionally, she is requesting alternative abortive therapy as she has been having a hard time getting sumatriptan 100 mg tabs from her pharmacy as well as sumatriptan 4 mg SQ injections, was told by her pharmacy that both medications were on back order.  She denies any change in the quality or nature of her migraines.      10/18/2018 - Interval History:  Headaches have persisted, she continues to feel the Botox is giving her some relief as the intensity of her migraines is decreased.  She feels her body has gotten used to the Botox injections, she also feels her body is getting used to doxepin as she is no longer sleeping well, she has an appointment with her rheumatologist on Monday who is prescribing the Doxepin, is interested in switching doxepin back to Amitriptyline, as she took Amitriptyline in the past and did find it was useful with regards to both sleep and migraine prevention.  She has been going to physical therapy regularly, is requesting a referral to neurosurgery for her neck to see what her options may be.       09/07/2018- Interval History:  Patient 30 minutes late for her appointment, discussed would administered Botox today as she drove from [place], however will need to see her back in 6 weeks for follow-up, which she is agreeable to.       06/29/2018- Interval History:  She continues to experience 5 days where she is headache free, prior to Botox, she was not having any headache free days.  Currently complaining of a headache rated 20 out of 10, though she is conversant, pleasant and laughing during clinic visit,  well.  She has received GammaCore device, which she does feel has decreased the intensity of her migraines, though it does not fully abort her migraines.  She has continued all medications as directed.  Has complaints of short term memory disturbances, which is very concerning to her, is considering filing for disability for memory disturbance.  Also complains of chronic pain and fibromyalgia for which she is under the care of Dr. Gomez and Dr. Urbano at [facility] in [place].  MRI C-Spine never done, will have imaging scheduled today. She does not wish to make adjustments to her treatment plan at this time, would like to do one more round of Botox to see if it gives her greater response, if not, will be willing to make adjustments at that time.  Headaches continue to feel the same as they always have, she denies any change in the quality or nature of her headaches.      04/20/2018- Interval History:  Headaches continue to occur nearly everyday, states for the first 6-8 weeks after Botox injections she does notice a decrease in the intensity of her headaches.  She is very happy to report she actually had two headache free days in a row since her last Botox injections.  For the first 4-6 weeks after last Botox injections she was actually having one headache free day per week, which she is happy about.  She can definitely tell when she is due for her next Botox injections, the intensity, frequency and duration of her headaches are significantly worse.  She continues to feel a portion of her headaches and migraines are coming from her neck and she requests to have imaging done of her neck. She does wish to continue with Botox injections for chronic migraine.       Interval History:  In the interim, headaches she has not noticed any difference in her headaches.  Nerve blocks helped for 1-2 days, but then returned at previous intensity and frequency.  Sumatriptan injectable 4 mg did cause the flushing,  however not as intense as 6 mg dose, and was effective with aborting her migraine, she is requesting to have sumatriptan pill available as well because she would prefer to not have to inject herself unless she really  Has to.  Previously had two rounds of Botox, last in August 2017, which were effective in preventing her migraines, she was noticing her headaches were easing off, she was not having as many aura events.  She denies any presence from Botox injections in the past and would like to restart the injections.       Initial ABREU HPI:  47 y.o. female with chronic migraines, anxiety, depression, vitamin D deficiency, and trouble sleeping, who presents to clinic with her significant other for evaluation of headaches.  Headaches previously managed by Neurologist in [place], but  forced her to establish care at alternative facility.    Headaches initially began in her early teenage years, states she has tried numerous medications none of which helped her.  Most recently she started Botox injections, states she had two rounds, after which she was only experiencing 7-8 headaches per month, last round done in August.  Since the middle of October, she has been experiencing a constant migraine all day, everyday.  Pain is described as a piercing or throbbing pain located on the right side of her head.  Onset of migraine very abrupt, over about 20 minutes.  Associated symptoms include nausea, photophobia, phonophobia.  She is currently taking Depakote 1000 mg nightly, Wellbutrin, Lyrica, and doxepin daily for prevention.  In the past she has used sumatriptan injectable for abortive, which she thinks worked, however caused facial flushing.  She would like to restart Botox injections for chronic migraine.       Treatments Tried and Response  Sumatriptan injectable - side effects, but it was effective    Imitrex - some relief   Maxalt - she did get some relief   Elavil - lost effectiveness   Doxepin -   Depakote    Cymbalta -   Topiramate - caused memory problems   Percocet -   Ultram -   Excedrin -   Lyrica -   Botox - helped   Wellbutrin - ?  Prednisone - helps, but causes her to break out in acne  Aimovig -     Current Medications:    amitriptyline (ELAVIL) 50 MG tablet, Take 1 tablet (50 mg total) by mouth every evening., Disp: 30 tablet, Rfl: 11    ASPIRIN/ACETAMINOPHEN/CAFFEINE (EXCEDRIN MIGRAINE ORAL), Take by mouth 2 (two) times daily as needed., Disp: , Rfl:     buPROPion (WELLBUTRIN XL) 150 MG TB24 tablet, Take 150 mg by mouth once daily., Disp: , Rfl:     cholecalciferol, vitamin D3, 5,000 unit Tab, Take 5,000 Units by mouth once daily., Disp: , Rfl:     diazePAM (VALIUM) 5 MG tablet, Take 5 mg by mouth 2 (two) times daily as needed., Disp: , Rfl:     divalproex ER (DEPAKOTE) 500 MG Tb24, Take 3 tablets (1,500 mg total) by mouth every evening., Disp: 270 tablet, Rfl: 3    ergocalciferol (VITAMIN D2) 50,000 unit Cap, Take 150,000 Units by mouth every 7 days., Disp: , Rfl:     pregabalin (LYRICA) 150 MG capsule, Take 150 mg by mouth 2 (two) times daily. , Disp: , Rfl:     sumatriptan (IMITREX STATDOSE) 6 mg/0.5 mL kit, Inject into skin at onset of migraine, may repeat dose in 1 hour if needed. Max 2 injections/day. Max 3 days/week., Disp: 12 mL, Rfl: 5    sumatriptan (IMITREX) 50 MG tablet, 1 tab at onset of migraine, may repeat in 2 hrs if needed. No more than 2 tabs/day or 3 days/week., Disp: 12 tablet, Rfl: 5    tiZANidine (ZANAFLEX) 4 MG tablet, Take 1 tablet (4 mg total) by mouth once daily., Disp: 30 tablet, Rfl: 11    traMADol (ULTRAM) 50 mg tablet, Take 50 mg by mouth 2 (two) times daily as needed., Disp: , Rfl:     Current Facility-Administered Medications:     onabotulinumtoxina injection 200 Units, 200 Units, Intramuscular, Q90 Days, SHMUEL Irizarry, 200 Units at 09/07/18 1152    onabotulinumtoxina injection 200 Units, 200 Units, Intramuscular, Q90 Days, SHMUEL Irizarry     onabotulinumtoxina injection 200 Units, 200 Units, Intramuscular, Q90 Days, Erika Vulevich, FNP, 200 Units at 11/29/18 1127    onabotulinumtoxina injection 200 Units, 200 Units, Intramuscular, Q90 Days, Erika Vulevich, FNP, 200 Units at 02/22/19 1124    onabotulinumtoxina injection 200 Units, 200 Units, Intramuscular, Q90 Days, Erika Vulevich, FNP, 200 Units at 05/15/19 1321    onabotulinumtoxina injection 200 Units, 200 Units, Intramuscular, Q90 Days, Erika Vulevich, FNP, 200 Units at 10/29/19 1000    Review of Systems - as per HPI, otherwise a balanced 10 systems review is negative.    OBJECTIVE:  Vitals:  /79   Pulse 84   Ht 5' (1.524 m)   Wt 68.8 kg (151 lb 10.8 oz)   BMI 29.62 kg/m²     Physical Exam:  Constitutional: she appears well-developed and well-nourished. she is well groomed. NAD     ASSESSMENT:  1. Chronic migraine    2. Intractable migraine without aura and without status migrainosus    3. Fibromyalgia    4. Cervical stenosis of spine      PLAN:  - Botox administered in clinic for Chronic Migraine (see below)   - Continue tizanidine 4 mg and depakote nightly for migraine prevention  - Gentle increase in amitriptyline to 50 mg nightly to help with both migraine prevention and sleep   - For migraine abortive refilled sumatriptan 50 mg tabs and sumatriptan 6 mg SQ injectable   - Fibromyalgia - continue regular f/up with rheumatology for management   - Cervical Stenosis - unable to get RFA, changing health insurance plans next year and is hoping to be able to get surgery early 2020, management per neurosurgery    - RTC in 12 weeks for repeat Botox injections or sooner if needed     Orders Placed This Encounter    sumatriptan (IMITREX STATDOSE) 6 mg/0.5 mL kit    sumatriptan (IMITREX) 50 MG tablet    tiZANidine (ZANAFLEX) 4 MG tablet    amitriptyline (ELAVIL) 50 MG tablet    divalproex ER (DEPAKOTE) 500 MG Tb24     All questions and concerns addressed.  Patient verbalizes  understanding and is agreeable with the above stated treatment plan.      PROCEDURE NOTE:  BOTOX was performed as an indicated therapy for intractable chronic migraine headaches given that the patient failed more than 2 headache medications    Two patient identifiers were confirmed with the patient prior to performing this procedure. A time out to determine correct patient and and agreement on procedure performed was conducted prior to the procedure.      Botulinum Toxin Injection Procedure   Procedure: Chemical neurolysis   After risks and benefits were explained including bleeding, infection, worsening of pain, damage to the areas being injected, weakness of muscles, loss of muscle control, dysphagia if injecting the head or neck, facial droop if injecting the facial area, painful injection, allergic or other reaction to the medications being injected, and the failure of the procedure to help the problem, a signed consent was obtained.   The patient was placed in a comfortable area and the sites to be treated were identified.The area to be treated was prepped three times with alcohol and the alcohol allowed to dry. Next, a 30 gauge needle was used to inject the medication in the area to be treated.      Total Botox used: 155 Units   Botox wastage: 45 Units     Injection sites:    muscle bilaterally ( a total of 10 units divided into 2 sites)   Procerus muscle (5 units)   Frontalis muscle bilaterally (a total of 20 units divided into 4 sites)   Temporalis muscle bilaterally (a total of 40 units divided into 8 sites)   Occipitalis muscle bilaterally (a total of 30 units divided into 6 sites)   Cervical paraspinal muscles (a total of 20 units divided into 4 sites)   Trapezius muscle bilaterally (a total of 30 units divided into 6 sites)   Complications: none   RTC for the next Botox injection: 12 weeks     CC: Melanie K Hall, MD Elizabeth C Vulevich, FNP-C [facility] Neu[facility]    [phone]

## 2020-01-07 ENCOUNTER — PATIENT MESSAGE (OUTPATIENT)
Dept: NEUROLOGY | Facility: CLINIC | Age: 50
End: 2020-01-07

## 2020-01-08 ENCOUNTER — TELEPHONE (OUTPATIENT)
Dept: NEUROLOGY | Facility: CLINIC | Age: 50
End: 2020-01-08

## 2020-01-08 NOTE — TELEPHONE ENCOUNTER
----- Message from Kimberly Monreal RN sent at 1/6/2020  1:27 PM CST -----      ----- Message -----  From: [facility]  Sent: 1/6/2020   1:16 PM CST  To: [facility] Staff    Patient Requesting Sooner Appointment.     Reason for sooner appt.: pt requesting earlier date, scheduled on 01/13/20  When is the first available appointment?  Communication Preference: [phone]  Additional Information:

## 2020-01-21 ENCOUNTER — PROCEDURE VISIT (OUTPATIENT)
Dept: NEUROLOGY | Facility: CLINIC | Age: 50
End: 2020-01-21
Payer: OTHER GOVERNMENT

## 2020-01-21 VITALS
SYSTOLIC BLOOD PRESSURE: 104 MMHG | HEART RATE: 66 BPM | DIASTOLIC BLOOD PRESSURE: 64 MMHG | HEIGHT: 61 IN | BODY MASS INDEX: 28.66 KG/M2

## 2020-01-21 DIAGNOSIS — M48.02 CERVICAL STENOSIS OF SPINE: ICD-10-CM

## 2020-01-21 DIAGNOSIS — F33.9 RECURRENT MAJOR DEPRESSIVE DISORDER, REMISSION STATUS UNSPECIFIED: ICD-10-CM

## 2020-01-21 DIAGNOSIS — F41.9 ANXIETY: ICD-10-CM

## 2020-01-21 DIAGNOSIS — M79.7 FIBROMYALGIA: ICD-10-CM

## 2020-01-21 DIAGNOSIS — G43.019 INTRACTABLE MIGRAINE WITHOUT AURA AND WITHOUT STATUS MIGRAINOSUS: ICD-10-CM

## 2020-01-21 PROCEDURE — 64615 CHEMODENERV MUSC MIGRAINE: CPT | Mod: PBBFAC | Performed by: NURSE PRACTITIONER

## 2020-01-21 PROCEDURE — 99214 OFFICE O/P EST MOD 30 MIN: CPT | Mod: 25,S$PBB,, | Performed by: NURSE PRACTITIONER

## 2020-01-21 PROCEDURE — 99214 PR OFFICE/OUTPT VISIT, EST, LEVL IV, 30-39 MIN: ICD-10-PCS | Mod: 25,S$PBB,, | Performed by: NURSE PRACTITIONER

## 2020-01-21 PROCEDURE — 64615 PR CHEMODENERVATION OF MUSCLE FOR CHRONIC MIGRAINE: ICD-10-PCS | Mod: S$PBB,,, | Performed by: NURSE PRACTITIONER

## 2020-01-21 PROCEDURE — 64615 CHEMODENERV MUSC MIGRAINE: CPT | Mod: S$PBB,,, | Performed by: NURSE PRACTITIONER

## 2020-01-21 RX ADMIN — ONABOTULINUMTOXINA 200 UNITS: 100 INJECTION, POWDER, LYOPHILIZED, FOR SOLUTION INTRADERMAL; INTRAMUSCULAR at 09:01

## 2020-01-21 NOTE — PROCEDURES
"SUBJECTIVE:  Patient ID: Massiel Alatorre  Chief Complaint: Follow-up and Botulinum Toxin Injection    History of Present Illness:  Massiel Alatorre is a 49 y.o. female who presents to clinic with her  for follow-up of headaches and Botox injections.     Recommendations made at last Office Visit on 10/29/2019:  - Botox administered in clinic for Chronic Migraine (see below)   - Continue tizanidine 4 mg and depakote nightly for migraine prevention  - Gentle increase in amitriptyline to 50 mg nightly to help with both migraine prevention and sleep   - For migraine abortive refilled sumatriptan 50 mg tabs and sumatriptan 6 mg SQ injectable   - Fibromyalgia - continue regular f/up with rheumatology for management   - Cervical Stenosis - unable to get RFA, changing health insurance plans next year and is hoping to be able to get surgery early 2020, management per neurosurgery    - RTC in 12 weeks for repeat Botox injections or sooner if needed      01/21/2020- Interval History:  Migraines have "leveled out", notes migraines have been better this winter than the usual because it hasn't been a cold winter.  Her dose amitriptyline has been increased to 50 mg, dose was increased about 1.5 weeks ago, per patient, the surgeon who will be repairing the nerves and tendons in her arm (damaged after she was bit by a dog last year) recommended increasing amitriptyline to help with nerve pain.  Unfortunately, increased dose of amitriptyline has caused her to feel more fatigued than usual during the day, she has no trouble falling asleep, typically wakes up around 3-4 AM, will feel tired again around 8-9 AM.  She has not been exercising regularly, but is planning to start using the elipitical she has at home.  Her and her  are planning to move to [place], [place] in the near future, will need to transition her care to provider in [place].  She is pleased with the current state of her migraines, quality of life has drastically " improved, she is experiencing at least 50% less debilitating migraine days, intensity of headache is also improved at least 50%.  She does wish to continue with Botox injections in the hopes of continued migraine control.     Otherwise, information below is still accurate and current.     10/29/2019- Interval History:  She continues to experienced up to 10 headache/migraine free days following Botox injections, admits over the last 12 weeks, she was attacked by a pitbull and has been dealing with the stress of this.  Has had significant pain in her left arm, was told she may need reconstructive surgery.  She is happy with the current state of her headaches and does not feel adjustments need to be made at this time.       Otherwise, information below is still accurate and current.      08/09/2019- Interval History:  She continues to experience 7-9 headache free days following Botox injections.  She was seen by Neurologist at a pain and spine clinic in [place], had a C1/C2 nerve block performed on July 30th, no migraine for 2 days following, is having a diagnostic nerve block with ultrasound guidance on August 13th, if improvement is clinically significant, plan is to move forward with what sounds to be RFA.  Migraines and headaches continue to feel the same as they always have, she admits her mood has been vastly improved since nerve blocks as finally feels there is a light at the end of the tunnel.  She is ready to get her life back!  No medication changes since her last visit.  Does want to continue with this round of Botox as per recommendation from Dr. De Leon.       05/15/2019- Interval History:  She continues to experience 7 headache free days after Botox injections.  She has continued using Aimovig 140 mg SQ monthly injections, denies presence of side effects as well as Depakote ER.  She continues to feel Botox is helping her migraines as they are more manageable when she does get one and daily headaches  are less intense.  She is under the care of a rheumatologist who has prescribed lyrica and amitriptyline.  She has continued complaints of trouble sleeping despite being started on Tizanidine 4 mg nightly by her Neurosurgeon.  Migraines continue to feel the same as they always have, she denies any change in the quality or nature of her migraines.      02/22/2019- Interval History:  She has had 7 full days over the last twelve weeks headache free.  She does feel her headaches have been getting better with dual Aimovig and Botox therapy and wishes to continue with Botox injections.  She has continued taking depakote, wellbutrin and amitriptyline daily.  Has been using either sumatriptan PO or injectable for migraine abortive.  Migraines continue to feel the same as they always have, she denies any change in the quality or nature of her migraines.      11/29/2018- Interval History:  She has started Aimovig 140 mg SQ injections, she has not noticed any change in her migraines since starting Aimovig.  She was seen by her Rheumatologist who switched doxepin to Amitriptyline, she is waiting for prescription from her pharmacy prior to beginning injections.  She has continued going to physical therapy regularly, is still looking for a Neurosurgeon to see for her neck pain closer to her home as her insurance would not approve her seeing Neurosurgery at [facility].  She does wish to continue with Botox injections for chronic migraine. Additionally, she is requesting alternative abortive therapy as she has been having a hard time getting sumatriptan 100 mg tabs from her pharmacy as well as sumatriptan 4 mg SQ injections, was told by her pharmacy that both medications were on back order.  She denies any change in the quality or nature of her migraines.      10/18/2018 - Interval History:  Headaches have persisted, she continues to feel the Botox is giving her some relief as the intensity of her migraines is decreased.  She feels  her body has gotten used to the Botox injections, she also feels her body is getting used to doxepin as she is no longer sleeping well, she has an appointment with her rheumatologist on Monday who is prescribing the Doxepin, is interested in switching doxepin back to Amitriptyline, as she took Amitriptyline in the past and did find it was useful with regards to both sleep and migraine prevention.  She has been going to physical therapy regularly, is requesting a referral to neurosurgery for her neck to see what her options may be.       09/07/2018- Interval History:  Patient 30 minutes late for her appointment, discussed would administered Botox today as she drove from [place], however will need to see her back in 6 weeks for follow-up, which she is agreeable to.       06/29/2018- Interval History:  She continues to experience 5 days where she is headache free, prior to Botox, she was not having any headache free days.  Currently complaining of a headache rated 20 out of 10, though she is conversant, pleasant and laughing during clinic visit, well.  She has received GammaCore device, which she does feel has decreased the intensity of her migraines, though it does not fully abort her migraines.  She has continued all medications as directed.  Has complaints of short term memory disturbances, which is very concerning to her, is considering filing for disability for memory disturbance.  Also complains of chronic pain and fibromyalgia for which she is under the care of Dr. Gomez and Dr. Urbano at [facility] in [place].  MRI C-Spine never done, will have imaging scheduled today. She does not wish to make adjustments to her treatment plan at this time, would like to do one more round of Botox to see if it gives her greater response, if not, will be willing to make adjustments at that time.  Headaches continue to feel the same as they always have, she denies any change in the quality or nature of her  headaches.      04/20/2018- Interval History:  Headaches continue to occur nearly everyday, states for the first 6-8 weeks after Botox injections she does notice a decrease in the intensity of her headaches.  She is very happy to report she actually had two headache free days in a row since her last Botox injections.  For the first 4-6 weeks after last Botox injections she was actually having one headache free day per week, which she is happy about.  She can definitely tell when she is due for her next Botox injections, the intensity, frequency and duration of her headaches are significantly worse.  She continues to feel a portion of her headaches and migraines are coming from her neck and she requests to have imaging done of her neck. She does wish to continue with Botox injections for chronic migraine.       Interval History:  In the interim, headaches she has not noticed any difference in her headaches.  Nerve blocks helped for 1-2 days, but then returned at previous intensity and frequency.  Sumatriptan injectable 4 mg did cause the flushing, however not as intense as 6 mg dose, and was effective with aborting her migraine, she is requesting to have sumatriptan pill available as well because she would prefer to not have to inject herself unless she really  Has to.  Previously had two rounds of Botox, last in August 2017, which were effective in preventing her migraines, she was noticing her headaches were easing off, she was not having as many aura events.  She denies any presence from Botox injections in the past and would like to restart the injections.       Initial ABREU HPI:  47 y.o. female with chronic migraines, anxiety, depression, vitamin D deficiency, and trouble sleeping, who presents to clinic with her significant other for evaluation of headaches.  Headaches previously managed by Neurologist in [place], but  forced her to establish care at alternative facility.    Headaches initially began  in her early teenage years, states she has tried numerous medications none of which helped her.  Most recently she started Botox injections, states she had two rounds, after which she was only experiencing 7-8 headaches per month, last round done in August.  Since the middle of October, she has been experiencing a constant migraine all day, everyday.  Pain is described as a piercing or throbbing pain located on the right side of her head.  Onset of migraine very abrupt, over about 20 minutes.  Associated symptoms include nausea, photophobia, phonophobia.  She is currently taking Depakote 1000 mg nightly, Wellbutrin, Lyrica, and doxepin daily for prevention.  In the past she has used sumatriptan injectable for abortive, which she thinks worked, however caused facial flushing.  She would like to restart Botox injections for chronic migraine.       Treatments Tried and Response  Sumatriptan injectable - side effects, but it was effective    Imitrex - some relief   Maxalt - she did get some relief   Elavil - lost effectiveness   Doxepin -   Depakote   Cymbalta -   Topiramate - caused memory problems   Percocet -   Ultram -   Excedrin -   Lyrica -   Botox - helped   Wellbutrin - ?  Prednisone - helps, but causes her to break out in acne  Aimovig -     Current Medications:    amitriptyline (ELAVIL) 50 MG tablet, Take 1 tablet (50 mg total) by mouth every evening., Disp: 30 tablet, Rfl: 11    ASPIRIN/ACETAMINOPHEN/CAFFEINE (EXCEDRIN MIGRAINE ORAL), Take by mouth 2 (two) times daily as needed., Disp: , Rfl:     buPROPion (WELLBUTRIN XL) 150 MG TB24 tablet, Take 150 mg by mouth once daily., Disp: , Rfl:     cholecalciferol, vitamin D3, 5,000 unit Tab, Take 5,000 Units by mouth once daily., Disp: , Rfl:     diazePAM (VALIUM) 5 MG tablet, Take 5 mg by mouth 2 (two) times daily as needed., Disp: , Rfl:     divalproex ER (DEPAKOTE) 500 MG Tb24, Take 3 tablets (1,500 mg total) by mouth every evening., Disp: 270 tablet, Rfl:  "3    ergocalciferol (VITAMIN D2) 50,000 unit Cap, Take 150,000 Units by mouth every 7 days., Disp: , Rfl:     pregabalin (LYRICA) 150 MG capsule, Take 150 mg by mouth 2 (two) times daily. , Disp: , Rfl:     sumatriptan (IMITREX STATDOSE) 6 mg/0.5 mL kit, Inject into skin at onset of migraine, may repeat dose in 1 hour if needed. Max 2 injections/day. Max 3 days/week., Disp: 12 mL, Rfl: 5    sumatriptan (IMITREX) 50 MG tablet, 1 tab at onset of migraine, may repeat in 2 hrs if needed. No more than 2 tabs/day or 3 days/week., Disp: 12 tablet, Rfl: 5    tiZANidine (ZANAFLEX) 4 MG tablet, Take 1 tablet (4 mg total) by mouth once daily., Disp: 30 tablet, Rfl: 11    traMADol (ULTRAM) 50 mg tablet, Take 50 mg by mouth 2 (two) times daily as needed., Disp: , Rfl:     Current Facility-Administered Medications:     onabotulinumtoxina injection 200 Units, 200 Units, Intramuscular, Q90 Days, Erika Watkins, FNP, 200 Units at 01/21/20 0956    Review of Systems - as per HPI, otherwise a balanced 10 systems review is negative.    OBJECTIVE:  Vitals:  /64   Pulse 66   Ht 5' 1" (1.549 m)   BMI 28.66 kg/m²     Physical Exam:  Constitutional: she appears well-developed and well-nourished. she is well groomed. NAD   HENT:    Head: Normocephalic and atraumatic  Eyes: Conjunctivae and EOM are normal  Musculoskeletal: Normal range of motion. No joint stiffness.   Skin: Skin is warm and dry.  Psychiatric: Mood and affect are normal    Neuro: Patient is alert and oriented to person, place, and time. Language is intact and fluent.  Recent and remote memory are intact.  Normal attention and concentration.  Facial movement is symmetric.  Gait is normal.     Review of Data:   Notes from PMR, neurosurgery and family medicine reviewed via care everywhere tab  Labs:  Recent CMP, Lipid Profile, CBC reviewed via care everywhere - unremarkable    Note: I have independently reviewed any/all imaging/labs/tests and agree with the " report (s) as documented.  Any discrepancies will be as noted/demarcated by free text.  GILBERT BAILEY 1/21/2020    ASSESSMENT:  1. Chronic migraine    2. Intractable migraine without aura and without status migrainosus    3. Recurrent major depressive disorder, remission status unspecified    4. Anxiety    5. Fibromyalgia    6. Cervical stenosis of spine      PLAN:  - Botox administered in clinic for Chronic Migraine (see below)   - Continue Amitriptyline 50 mg nightly, discussed daytime fatigue may improve with time  - Encouraged her to begin regular exercise to improve energy levels, she may notice better quality sleep at night as well   - Continue Depakote 1500 mg nightly, tizanidine 4 mg nightly   - For migraine abortive - has sumatriptan 50 mg tabs and sumatriptan 6 mg injectable available   - MDD/Anxiety - well controlled on wellbutrin with prn valium available, management per psychiatry   - FBM - well controlled on lyrica 150 mg BID, management per rheumatology   - Cervical stenosis - continue regular f/up with PMR for management   - Will have next round of Botox performed here, then will be transitioning her care to [facility] in 12 weeks for repeat Botox injections or sooner if needed          All questions and concerns addressed.  Patient verbalizes understanding and is agreeable with the above stated treatment plan.      PROCEDURE NOTE:  BOTOX was performed as an indicated therapy for intractable chronic migraine headaches given that the patient failed more than 2 headache medications    A time out was conducted just before the start of the procedure to verify the correct patient and procedure, procedure location, and all relevant critical information.     Botulinum Toxin Injection Procedure     PROCEDURE PERFORMED: Botulinum toxin injection (07777)  CLINICAL INDICATION: G43.719  After risks and benefits were explained including bleeding, infection, worsening of pain, damage to the areas being  injected, weakness of muscles, loss of muscle control, dysphagia if injecting the head or neck, facial droop if injecting the facial area, painful injection, allergic or other reaction to the medications being injected, and the failure of the procedure to help the problem, a signed consent was obtained.   The patient was placed in a comfortable area and the sites to be treated were identified.The area to be treated was prepped three times with alcohol and the alcohol allowed to dry. Next, a 30 gauge needle was used to inject the medication in the area to be treated.      Total Botox used: 155 Units   Unavoidable waste: 45 Units     Injection sites:    muscle bilaterally ( a total of 10 units divided into 2 sites)   Procerus muscle (5 units)   Frontalis muscle bilaterally (a total of 20 units divided into 4 sites)   Temporalis muscle bilaterally (a total of 40 units divided into 8 sites)   Occipitalis muscle bilaterally (a total of 30 units divided into 6 sites)   Cervical paraspinal muscles (a total of 20 units divided into 4 sites)   Trapezius muscle bilaterally (a total of 30 units divided into 6 sites)   Complications: none   RTC for the next Botox injection: 12 weeks     CC: MD Erika Cohen, [facility]  [facility] of Neurology   [phone]

## 2020-04-03 ENCOUNTER — TELEPHONE (OUTPATIENT)
Dept: NEUROLOGY | Facility: CLINIC | Age: 50
End: 2020-04-03

## 2020-05-14 ENCOUNTER — PATIENT MESSAGE (OUTPATIENT)
Dept: NEUROLOGY | Facility: CLINIC | Age: 50
End: 2020-05-14